# Patient Record
Sex: FEMALE | Race: BLACK OR AFRICAN AMERICAN | Employment: FULL TIME | URBAN - METROPOLITAN AREA
[De-identification: names, ages, dates, MRNs, and addresses within clinical notes are randomized per-mention and may not be internally consistent; named-entity substitution may affect disease eponyms.]

---

## 2017-12-05 ENCOUNTER — HOSPITAL ENCOUNTER (OUTPATIENT)
Dept: LAB | Age: 27
Discharge: HOME | End: 2017-12-05
Payer: OTHER GOVERNMENT

## 2017-12-05 PROCEDURE — 88175 CYTOPATH C/V AUTO FLUID REDO: CPT | Performed by: OBSTETRICS & GYNECOLOGY

## 2018-08-13 ENCOUNTER — OFFICE VISIT (OUTPATIENT)
Dept: FAMILY MEDICINE CLINIC | Age: 28
End: 2018-08-13

## 2018-08-13 ENCOUNTER — HOSPITAL ENCOUNTER (OUTPATIENT)
Dept: LAB | Age: 28
Discharge: HOME | End: 2018-08-13
Payer: OTHER GOVERNMENT

## 2018-08-13 VITALS
HEIGHT: 64 IN | DIASTOLIC BLOOD PRESSURE: 63 MMHG | BODY MASS INDEX: 22.61 KG/M2 | SYSTOLIC BLOOD PRESSURE: 114 MMHG | RESPIRATION RATE: 16 BRPM | TEMPERATURE: 98.8 F | OXYGEN SATURATION: 98 % | HEART RATE: 74 BPM | WEIGHT: 132.4 LBS

## 2018-08-13 DIAGNOSIS — L70.0 ACNE VULGARIS: ICD-10-CM

## 2018-08-13 DIAGNOSIS — Z00.00 ANNUAL PHYSICAL EXAM: Primary | ICD-10-CM

## 2018-08-13 DIAGNOSIS — D17.79 LIPOMA OF OTHER SPECIFIED SITES: ICD-10-CM

## 2018-08-13 DIAGNOSIS — Z00.00 ANNUAL PHYSICAL EXAM: ICD-10-CM

## 2018-08-13 LAB
ALBUMIN SERPL-MCNC: 3.5 G/DL (ref 3.4–5)
ALBUMIN/GLOB SERPL: 1 {RATIO} (ref 0.8–1.7)
ALP SERPL-CCNC: 19 U/L (ref 45–117)
ALT SERPL-CCNC: 16 U/L (ref 13–56)
ANION GAP SERPL CALC-SCNC: 7 MMOL/L (ref 3–18)
AST SERPL-CCNC: 11 U/L (ref 15–37)
BASOPHILS # BLD: 0 K/UL (ref 0–0.1)
BASOPHILS NFR BLD: 0 % (ref 0–2)
BILIRUB SERPL-MCNC: 0.6 MG/DL (ref 0.2–1)
BUN SERPL-MCNC: 12 MG/DL (ref 7–18)
BUN/CREAT SERPL: 18 (ref 12–20)
CALCIUM SERPL-MCNC: 8.6 MG/DL (ref 8.5–10.1)
CHLORIDE SERPL-SCNC: 108 MMOL/L (ref 100–108)
CHOLEST SERPL-MCNC: 126 MG/DL
CO2 SERPL-SCNC: 25 MMOL/L (ref 21–32)
CREAT SERPL-MCNC: 0.67 MG/DL (ref 0.6–1.3)
DIFFERENTIAL METHOD BLD: ABNORMAL
EOSINOPHIL # BLD: 0.2 K/UL (ref 0–0.4)
EOSINOPHIL NFR BLD: 3 % (ref 0–5)
ERYTHROCYTE [DISTWIDTH] IN BLOOD BY AUTOMATED COUNT: 13 % (ref 11.6–14.5)
GLOBULIN SER CALC-MCNC: 3.6 G/DL (ref 2–4)
GLUCOSE SERPL-MCNC: 114 MG/DL (ref 74–99)
HCT VFR BLD AUTO: 34.9 % (ref 35–45)
HDLC SERPL-MCNC: 66 MG/DL (ref 40–60)
HDLC SERPL: 1.9 {RATIO} (ref 0–5)
HGB BLD-MCNC: 11.2 G/DL (ref 12–16)
LDLC SERPL CALC-MCNC: 50 MG/DL (ref 0–100)
LIPID PROFILE,FLP: ABNORMAL
LYMPHOCYTES # BLD: 1.6 K/UL (ref 0.9–3.6)
LYMPHOCYTES NFR BLD: 27 % (ref 21–52)
MCH RBC QN AUTO: 27.6 PG (ref 24–34)
MCHC RBC AUTO-ENTMCNC: 32.1 G/DL (ref 31–37)
MCV RBC AUTO: 86 FL (ref 74–97)
MONOCYTES # BLD: 0.4 K/UL (ref 0.05–1.2)
MONOCYTES NFR BLD: 7 % (ref 3–10)
NEUTS SEG # BLD: 3.8 K/UL (ref 1.8–8)
NEUTS SEG NFR BLD: 63 % (ref 40–73)
PLATELET # BLD AUTO: 318 K/UL (ref 135–420)
PMV BLD AUTO: 10.5 FL (ref 9.2–11.8)
POTASSIUM SERPL-SCNC: 3.7 MMOL/L (ref 3.5–5.5)
PROT SERPL-MCNC: 7.1 G/DL (ref 6.4–8.2)
RBC # BLD AUTO: 4.06 M/UL (ref 4.2–5.3)
SODIUM SERPL-SCNC: 140 MMOL/L (ref 136–145)
TRIGL SERPL-MCNC: 50 MG/DL (ref ?–150)
TSH SERPL DL<=0.05 MIU/L-ACNC: 0.61 UIU/ML (ref 0.36–3.74)
VLDLC SERPL CALC-MCNC: 10 MG/DL
WBC # BLD AUTO: 6.1 K/UL (ref 4.6–13.2)

## 2018-08-13 PROCEDURE — 80061 LIPID PANEL: CPT | Performed by: INTERNAL MEDICINE

## 2018-08-13 PROCEDURE — 36415 COLL VENOUS BLD VENIPUNCTURE: CPT | Performed by: INTERNAL MEDICINE

## 2018-08-13 PROCEDURE — 85025 COMPLETE CBC W/AUTO DIFF WBC: CPT | Performed by: INTERNAL MEDICINE

## 2018-08-13 PROCEDURE — 80053 COMPREHEN METABOLIC PANEL: CPT | Performed by: INTERNAL MEDICINE

## 2018-08-13 PROCEDURE — 84443 ASSAY THYROID STIM HORMONE: CPT | Performed by: INTERNAL MEDICINE

## 2018-08-13 RX ORDER — NORGESTIMATE AND ETHINYL ESTRADIOL 0.25-0.035
1 KIT ORAL DAILY
Status: ON HOLD | COMMUNITY
End: 2019-06-17 | Stop reason: ALTCHOICE

## 2018-08-13 RX ORDER — BENZOYL PEROXIDE 5 G/100G
GEL TOPICAL
Qty: 60 G | Refills: 0 | Status: ON HOLD | OUTPATIENT
Start: 2018-08-13 | End: 2019-07-04

## 2018-08-13 NOTE — PROGRESS NOTES
Eleuterio Fallon is a 28year old female presenting with a lump on her back, on the right side. It was first noticed 4 years ago and she has not had any prior lumps. The lump seems to be enlarging. She does not take any medication for this lump. The lump is accompanied by occasional shoulder stiffness/soreness, mostly with exertion that is localized and does not radiate. The associated pain is mild. There seems to be no connection between time of day and size. Patient denies fever, chills, fatigue or weight loss     PMH:   - Anemia  - Sciatica   - Acne     Meds:   -  Ethinyl Estradiol/Norgestimate    PSH:   - Mccall Teeth, 1 year ago, without complications    Allergies:  - No known drug or food allergies, has seasonal allergies, dust and pet dander allergies as well (cause sneezing and runny nose)     FH:  - Mother, 48, severe asthma, anemic, gets \"cysts\"   - Father, , patient not aware of health   - Rominaer, 4, Healthy     SH:   - No tobacco use  - Drinks wine, 9-/  - No illicit drugs   - Eats an overall well balanced meal   - Gym 2-3/week   -  Sexually Active, PAPs annually, no concern of STI  - Needs Vaccine records reviewed for needed vaccines, likely needs tDAP. ROS:   -     Rep/Gyn:   LMP:   *ATTENTION:  This note has been created by a medical student for educational purposes only. Please do not refer to the content of this note for clinical decision-making, billing, or other purposes. Please see attending physicians note to obtain clinical information on this patient. *

## 2018-08-13 NOTE — MR AVS SNAPSHOT
[address] [facility] 
 
 
 [address] [facility] [address] [facility] [address] 
[phone] Patient: Boy Hogan MRN: [id number] GJN:[id number] Visit Information Date & Time Provider Department Dept. Phone Encounter #  
 8/13/2018  3:00 PM Ashly Trevino [phone] [id number] Upcoming Health Maintenance Date Due DTaP/Tdap/Td series (1 - Tdap) 8/21/2010 Influenza Age 5 to Adult 8/1/2018 PAP AKA CERVICAL CYTOLOGY 12/5/2020 Allergies as of 8/13/2018  Review Complete On: 8/13/2018 By: Abelino Parents, LPN Severity Noted Reaction Type Reactions Other Plant, Animal, Environmental  05/07/2015    Cough, Sneezing, Other (comments) Seasonal allergies - sneezing, cough, itchy eyes Current Immunizations  Reviewed on 2/15/2014 No immunizations on file. Not reviewed this visit You Were Diagnosed With   
  
 Codes Comments Annual physical exam    -  Primary ICD-10-CM: Z00.00 ICD-9-CM: V70.0 Acne vulgaris     ICD-10-CM: L70.0 ICD-9-CM: 706.1 Lipoma of other specified sites     ICD-10-CM: D17.79 ICD-9-CM: 214.8 Vitals BP Pulse Temp Resp Height(growth percentile) Weight(growth percentile) 114/63 74 98.8 °F (37.1 °C) (Oral) 16 5' 4\" (1.626 m) 132 lb 6.4 oz (60.1 kg) LMP SpO2 BMI OB Status Smoking Status 07/11/2018 98% 22.73 kg/m2 Postpartum Never Smoker Vitals History BMI and BSA Data Body Mass Index Body Surface Area  
 22.73 kg/m 2 1.65 m 2 Preferred Pharmacy Pharmacy Name Phone [facility] [place], [address] [place] [address] [phone] Your Updated Medication List  
  
   
This list is accurate as of 8/13/18  3:59 PM.  Always use your most recent med list.  
  
  
  
  
 benzoyl peroxide 5 % topical gel Apply  to affected area nightly. apply to affected area as directed ibuprofen 800 mg tablet Commonly known as:  MOTRIN You may take this three times a day for only 2-3 days. Iron 325 mg (65 mg iron) tablet Generic drug:  ferrous sulfate Take  by mouth Daily (before breakfast). prenatal multivit-ca-min-fe-fa Tab Take  by mouth. [address] [facility] (28) 0.25-35 mg-mcg Tab Generic drug:  norgestimate-ethinyl estradiol Take 1 Tab by mouth daily. Prescriptions Sent to Pharmacy Refills  
 benzoyl peroxide 5 % topical gel 0 Sig: Apply  to affected area nightly. apply to affected area as directed Class: Normal  
 Pharmacy: [facility] [address] [place] [address] Ph #: [phone] Route: Topical  
  
We Performed the Following REFERRAL TO GENERAL SURGERY [REF27 Custom] To-Do List   
 08/13/2018 Lab:  CBC WITH AUTOMATED DIFF   
  
 08/13/2018 Lab:  LIPID PANEL   
  
 08/13/2018 Lab:  METABOLIC PANEL, COMPREHENSIVE   
  
 08/13/2018 Lab:  TSH 3RD GENERATION Referral Information Referral ID Referred By Referred To  
  
 [id number] [address] Not Available Visits Status Start Date End Date 1 New Request 8/13/18 8/13/19 If your referral has a status of pending review or denied, additional information will be sent to support the outcome of this decision. Introducing \[facility]\"" & HEALTH SERVICES! Philomena Wagner introduces [employer] patient portal. Now you can access parts of your medical record, email your doctor's office, and request medication refills online. 1. In your internet browser, go to [email or web address] 2. Click on the First Time User? Click Here link in the Sign In box. You will see the New Member Sign Up page. 3. Enter your [employer] Access Code exactly as it appears below. You will not need to use this code after youve completed the sign-up process.  If you do not sign up before the expiration date, you must request a new code. · Dimdim Access Code: [email or web address] Expires: 11/11/2018  3:59 PM 
 
4. Enter the last four digits of your Social Security Number (xxxx) and Date of Birth (mm/dd/yyyy) as indicated and click Submit. You will be taken to the next sign-up page. 5. Create a Dimdim ID. This will be your Dimdim login ID and cannot be changed, so think of one that is secure and easy to remember. 6. Create a Dimdim password. You can change your password at any time. 7. Enter your Password Reset Question and Answer. This can be used at a later time if you forget your password. 8. Enter your e-mail address. You will receive e-mail notification when new information is available in [address] 9. Click Sign Up. You can now view and download portions of your medical record. 10. Click the Download Summary menu link to download a portable copy of your medical information. If you have questions, please visit the Frequently Asked Questions section of the Dimdim website. Remember, Dimdim is NOT to be used for urgent needs. For medical emergencies, dial 911. Now available from your iPhone and Android! Please provide this summary of care documentation to your next provider. Your primary care clinician is listed as Veronika Cheng. If you have any questions after today's visit, please call [phone].

## 2018-08-15 ENCOUNTER — TELEPHONE (OUTPATIENT)
Dept: FAMILY MEDICINE CLINIC | Age: 28
End: 2018-08-15

## 2018-08-15 DIAGNOSIS — D50.8 OTHER IRON DEFICIENCY ANEMIA: Primary | ICD-10-CM

## 2018-08-15 RX ORDER — LANOLIN ALCOHOL/MO/W.PET/CERES
325 CREAM (GRAM) TOPICAL 2 TIMES DAILY
Qty: 60 TAB | Refills: 3 | Status: SHIPPED | OUTPATIENT
Start: 2018-08-15 | End: 2021-06-14

## 2018-08-22 ENCOUNTER — OFFICE VISIT (OUTPATIENT)
Dept: SURGERY | Age: 28
End: 2018-08-22

## 2018-08-22 VITALS
SYSTOLIC BLOOD PRESSURE: 116 MMHG | WEIGHT: 131.8 LBS | HEIGHT: 64 IN | RESPIRATION RATE: 16 BRPM | BODY MASS INDEX: 22.5 KG/M2 | TEMPERATURE: 98.5 F | DIASTOLIC BLOOD PRESSURE: 68 MMHG | OXYGEN SATURATION: 99 % | HEART RATE: 69 BPM

## 2018-08-22 DIAGNOSIS — M25.811 MASS OF JOINT OF RIGHT SHOULDER: Primary | ICD-10-CM

## 2018-08-22 NOTE — MR AVS SNAPSHOT
[address] [facility] 
 
 
 [address] [place] [address] 
[phone] Patient: Natasha Escobar MRN: [id number] [facility]:[birth year] Visit Information Date & Time Provider Department Dept. Phone Encounter #  
 8/22/2018  8:15 AM Ravinder Aguiar MD [facility] [phone] [id number] Your Appointments 10/10/2018  3:45 PM  
POST OP with Ravinder Aguiar MD  
[address] [facility] Appt Note: Post op [address] [place] [address] [place]  
  
   
 [address] [facility] [address] [facility] [address] Upcoming Health Maintenance Date Due DTaP/Tdap/Td series (1 - Tdap) 8/21/2010 Influenza Age 5 to Adult 8/1/2018 PAP AKA CERVICAL CYTOLOGY 12/5/2020 Allergies as of 8/22/2018  Review Complete On: 8/22/2018 By: Conor Livingston LPN Severity Noted Reaction Type Reactions Other Plant, Animal, Environmental  05/07/2015    Cough, Sneezing, Other (comments) Seasonal allergies - sneezing, cough, itchy eyes Current Immunizations  Reviewed on 2/15/2014 No immunizations on file. Not reviewed this visit You Were Diagnosed With   
  
 Codes Comments Mass of joint of right shoulder    -  Primary ICD-10-CM: M25.811 ICD-9-CM: 719.61 Vitals BP Pulse Temp Resp Height(growth percentile) Weight(growth percentile) 116/68 (BP 1 Location: Right arm, BP Patient Position: Sitting) 69 98.5 °F (36.9 °C) (Oral) 16 5' 4\" (1.626 m) 131 lb 12.8 oz (59.8 kg) SpO2 BMI OB Status Smoking Status 99% 22.62 kg/m2 Postpartum Never Smoker BMI and BSA Data Body Mass Index Body Surface Area  
 22.62 kg/m 2 1.64 m 2 Preferred Pharmacy Pharmacy Name Phone [facility] [address] [phone] Your Updated Medication List  
  
   
This list is accurate as of 8/22/18  8:53 AM.  Always use your most recent med list.  
  
  
  
  
 benzoyl peroxide 5 % topical gel Apply  to affected area nightly. apply to affected area as directed  
  
 ibuprofen 800 mg tablet Commonly known as:  MOTRIN You may take this three times a day for only 2-3 days. * Iron 325 mg (65 mg iron) tablet Generic drug:  ferrous sulfate Take  by mouth Daily (before breakfast). * ferrous sulfate 325 mg (65 mg iron) tablet Take 1 Tab by mouth two (2) times a day. prenatal multivit-ca-min-fe-fa Tab Take  by mouth. [facility] (28) 0.25-35 mg-mcg Tab Generic drug:  norgestimate-ethinyl estradiol Take 1 Tab by mouth daily. * Notice: This list has 2 medication(s) that are the same as other medications prescribed for you. Read the directions carefully, and ask your doctor or other care provider to review them with you. Patient Instructions If you have any questions or concerns about today's appointment, the verbal and/or written instructions you were given for follow up care, please call our office at [phone]. [facility] [id number] [place] [address], [address] 
 
[phone] office [phone] fax PATIENT PRE AND POST OPERATIVE INSTRUCTIONS [address] [facility] 
[phone] Before Surgery Instructions:  
1) You must have someone available to drive you to and from your procedure and stay with you for the first 24 hours. 2) It is very important that you have nothing to eat or drink after midnight the night before your surgery. This includes chewing gum or sucking on hard candy. Take only heart, blood pressure and cholesterol medications the morning of surgery with only a sip of water. 3) Please stop taking Plavix 10 days prior to your surgery.  Stop taking Coumadin 5 days prior to your surgery. Stop taking all Aspirin or Aspirin containing products 7 days prior to your surgery. Stop taking Advil, Motrin, Aleve, and etc. 3 days prior to your surgery. 4) If you take any diabetic medications please consult with your primary care physician on how to take them on the day of your surgery 5) Please stop all Herbal products 2 weeks prior to your surgery. 6) Please arrive at the hospital 2 hours prior to your surgery, unless you have been otherwise instructed. 7) Patients having an operation on their colon will be given a separate instruction sheet on their Bowel Prep. 8) For any pre-operative work up check in at the main entrance to [facility], and then go to Patient Registration. These studies are done on a walk in basis they are open from 7:00am to 5:00pm Monday through Friday. 9) Please wash your surgical site the morning of your surgery with soap and water. 10) If you are of child bearing age you will have pregnancy test done the morning of your surgery as soon as you arrive. 11) You may be contacted to change your surgery time. At times this is necessary due to equipment or staffing needs. After Surgery Instructions: You will need to be seen in the office for a follow-up visit 7-14 days after your surgery. Please call after you have had the procedure to make this appointment. Unless otherwise instructed, you may remove your outer bandage and shower 48 hours after your surgery. If you develop a fever greater than 101, have any significant drainage, bleeding, swelling and/or pus of the wound. Please call our office immediately. Surgery Date and Time:  Thursday, September 27, 2018 at 1:30pm 
 
Please check in at [facility], enter through the Emergency Room entrance and go up to the second floor. Please check in by 11:30am the day of your surgery. You may contact Fili Almendarez with any questions at [phone]. Patient Instructions History Introducing \[facility]\"" & HEALTH SERVICES! Alphonso Luna introduces [employer] patient portal. Now you can access parts of your medical record, email your doctor's office, and request medication refills online. 1. In your internet browser, go to [email or web address] 2. Click on the First Time User? Click Here link in the Sign In box. You will see the New Member Sign Up page. 3. Enter your [employer] Access Code exactly as it appears below. You will not need to use this code after youve completed the sign-up process. If you do not sign up before the expiration date, you must request a new code. · [employer] Access Code: [email or web address] Expires: 11/11/2018  3:59 PM 
 
4. Enter the last four digits of your Social Security Number (xxxx) and Date of Birth (mm/dd/yyyy) as indicated and click Submit. You will be taken to the next sign-up page. 5. Create a DanceTrippin ID. This will be your [employer] login ID and cannot be changed, so think of one that is secure and easy to remember. 6. Create a [employer] password. You can change your password at any time. 7. Enter your Password Reset Question and Answer. This can be used at a later time if you forget your password. 8. Enter your e-mail address. You will receive e-mail notification when new information is available in [address] 9. Click Sign Up. You can now view and download portions of your medical record. 10. Click the Download Summary menu link to download a portable copy of your medical information. If you have questions, please visit the Frequently Asked Questions section of the [employer] website. Remember, [employer] is NOT to be used for urgent needs. For medical emergencies, dial 911. Now available from your iPhone and Android! Please provide this summary of care documentation to your next provider. Your primary care clinician is listed as Rhona Martins.  If you have any questions after today's visit, please call [phone].

## 2018-08-22 NOTE — PROGRESS NOTES
General Surgery Consult    Nona Garcia  Admit date: (Not on file)    MRN: [id number]     : 1990     Age: 33 y.o. Attending Physician: Catherine To MD, [facility]      History of Present Illness:      Nona Garcia is a 33 y.o. female who presented with a right shoulder mass . The patient has stated that this mass has been present for at least 4 years . She was scheduled to have the mass surgically removed however the patient did not get pregnant and this was canceled . The patient is stating that the mass has been increasing in size . She said the people are noticing that the mass has been increasing in size. She is also complaining of pain and discomfort especially with movement. She denies any fever or chills. She denies any drainage from the mass. She denies any skin changes though she has a tattoo on the mass. Patient Active Problem List    Diagnosis Date Noted    Vacuum extraction, delivered, current hospitalization 2014    Second degree laceration of perineum, delivered, current hospitalization 2014    Normal labor 2014    40 weeks gestation of pregnancy 2014     History reviewed. No pertinent past medical history. History reviewed. No pertinent surgical history. Social History   Substance Use Topics    Smoking status: Never Smoker    Smokeless tobacco: Never Used    Alcohol use No      History   Smoking Status    Never Smoker   Smokeless Tobacco    Never Used     Family History   Problem Relation Age of Onset    Asthma Mother     Cancer Father     Diabetes Maternal Aunt     Diabetes Maternal Grandmother     Hypertension Maternal Grandmother     Kidney Disease Maternal Grandmother      dialysis      Current Outpatient Prescriptions   Medication Sig    ferrous sulfate 325 mg (65 mg iron) tablet Take 1 Tab by mouth two (2) times a day.     norgestimate-ethinyl estradiol (SPRINTEC, 28,) 0.25-35 mg-mcg tab Take 1 Tab by mouth daily.    benzoyl peroxide 5 % topical gel Apply  to affected area nightly. apply to affected area as directed    prenatal multivit-ca-min-fe-fa tab Take  by mouth.  ferrous sulfate (IRON) 325 mg (65 mg iron) tablet Take  by mouth Daily (before breakfast).  ibuprofen (MOTRIN) 800 mg tablet You may take this three times a day for only 2-3 days. No current facility-administered medications for this visit. Allergies   Allergen Reactions    Other Plant, Animal, Environmental Cough, Sneezing and Other (comments)     Seasonal allergies - sneezing, cough, itchy eyes            Review of Systems:  Pertinent items are noted in the History of Present Illness. Objective:     Visit Vitals    /68 (BP 1 Location: Right arm, BP Patient Position: Sitting)    Pulse 69    Temp 98.5 °F (36.9 °C) (Oral)    Resp 16    Ht 5' 4\" (1.626 m)    Wt 59.8 kg (131 lb 12.8 oz)    SpO2 99%    BMI 22.62 kg/m2       Physical Exam:      General:  in no apparent distress, alert and oriented times 3   Eyes:  conjunctivae and sclerae normal, pupils equal, round, reactive to light   Throat & Neck: no erythema or exudates noted and neck supple and symmetrical; no palpable masses           Right shoulder/back: There is a large soft tissue tumor, about 8 x 5 cm located in the posterior right shoulder. The mass is easily movable and nontender. There is no overlying skin changes.                 Imaging and Lab Review:     CBC:   Lab Results   Component Value Date/Time    WBC 6.1 08/13/2018 04:06 PM    RBC 4.06 (L) 08/13/2018 04:06 PM    HGB 11.2 (L) 08/13/2018 04:06 PM    HCT 34.9 (L) 08/13/2018 04:06 PM    PLATELET 500 60/67/5659 04:06 PM     BMP:   Lab Results   Component Value Date/Time    Glucose 114 (H) 08/13/2018 04:06 PM    Sodium 140 08/13/2018 04:06 PM    Potassium 3.7 08/13/2018 04:06 PM    Chloride 108 08/13/2018 04:06 PM    CO2 25 08/13/2018 04:06 PM    BUN 12 08/13/2018 04:06 PM    Creatinine 0.67 08/13/2018 04:06 PM    Calcium 8.6 08/13/2018 04:06 PM     CMP:  Lab Results   Component Value Date/Time    Glucose 114 (H) 08/13/2018 04:06 PM    Sodium 140 08/13/2018 04:06 PM    Potassium 3.7 08/13/2018 04:06 PM    Chloride 108 08/13/2018 04:06 PM    CO2 25 08/13/2018 04:06 PM    BUN 12 08/13/2018 04:06 PM    Creatinine 0.67 08/13/2018 04:06 PM    Calcium 8.6 08/13/2018 04:06 PM    Anion gap 7 08/13/2018 04:06 PM    BUN/Creatinine ratio 18 08/13/2018 04:06 PM    Alk. phosphatase 19 (L) 08/13/2018 04:06 PM    Protein, total 7.1 08/13/2018 04:06 PM    Albumin 3.5 08/13/2018 04:06 PM    Globulin 3.6 08/13/2018 04:06 PM    A-G Ratio 1.0 08/13/2018 04:06 PM       No results found for this or any previous visit (from the past 24 hour(s)). images and reports reviewed    Assessment:   Natasha Escobar is a 33 y.o. female is presenting with a large right shoulder mass most likely a lipoma. The patient would like it to be excised giving the fact that is increasing in size and it is causing pain.      Plan:     Schedule for excision of a right shoulder mass    Please call me if you have any questions (cell phone: [phone])     Signed By: Ravinder Aguiar MD     August 22, 2018

## 2018-09-18 ENCOUNTER — TELEPHONE (OUTPATIENT)
Dept: SURGERY | Age: 28
End: 2018-09-18

## 2018-09-18 NOTE — TELEPHONE ENCOUNTER
Ms. Jimbo Brown called to cancel her surgery on Thursday, September 27, 2018 stating due to the weather last week, and leave of absence from work, she would like to reschedule her surgery for January 2019. I informed Ms. Anthony she would need to schedule a pre op visit in December 2018 for Dr. Marielos Kamara to re-evaluate the shoulder mass.   An appointment was scheduled for Monday, December 10, 2018 at 8:00am

## 2018-11-26 LAB
ANTIBODY SCREEN, EXTERNAL: NEGATIVE
CHLAMYDIA, EXTERNAL: NEGATIVE
HBSAG, EXTERNAL: NEGATIVE
HEPATITIS C AB,   EXT: NEGATIVE
HIV, EXTERNAL: NEGATIVE
N. GONORRHEA, EXTERNAL: NEGATIVE
RUBELLA, EXTERNAL: NORMAL
T. PALLIDUM, EXTERNAL: NEGATIVE
TYPE, ABO & RH, EXTERNAL: NORMAL

## 2019-01-23 ENCOUNTER — HOSPITAL ENCOUNTER (EMERGENCY)
Age: 29
Discharge: HOME | End: 2019-01-23
Attending: EMERGENCY MEDICINE
Payer: OTHER GOVERNMENT

## 2019-01-23 VITALS
DIASTOLIC BLOOD PRESSURE: 72 MMHG | SYSTOLIC BLOOD PRESSURE: 124 MMHG | RESPIRATION RATE: 16 BRPM | BODY MASS INDEX: 23.17 KG/M2 | TEMPERATURE: 98 F | HEART RATE: 87 BPM | WEIGHT: 135 LBS | OXYGEN SATURATION: 100 %
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
## 2019-01-23 DIAGNOSIS — S63.91XA HAND SPRAIN, RIGHT, INITIAL ENCOUNTER: ICD-10-CM

## 2019-01-23 DIAGNOSIS — S13.4XXA WHIPLASH INJURIES, INITIAL ENCOUNTER: Primary | ICD-10-CM

## 2019-01-23 PROCEDURE — 74011250637 HC RX REV CODE- 250/637: Performed by: PHYSICIAN ASSISTANT

## 2019-01-23 PROCEDURE — 99283 EMERGENCY DEPT VISIT LOW MDM: CPT

## 2019-01-23 RX ORDER — ACETAMINOPHEN 325 MG/1
650 TABLET ORAL ONCE
Status: COMPLETED | OUTPATIENT
Start: 2019-01-23 | End: 2019-01-23

## 2019-01-23 RX ADMIN — ACETAMINOPHEN 650 MG: 325 TABLET, FILM COATED ORAL at 15:24
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
## 2019-01-23 NOTE — LETTER
[facility] EMERGENCY DEPT 
[address] [place] [id number] 
[phone] Work/School Note Date: 1/23/2019 To Whom It May concern: 
 
Cady Davis was seen and treated today in the emergency room by the following provider(s): 
Attending Provider: Yanna Osuna DO Physician Assistant: Megan Gómez. Rhina Cueto Newberry may return to work on 1/26/2019.  
 
Sincerely, 
 
 
 
 
VERNA Santana

## 2019-01-23 NOTE — ED NOTES
I have reviewed discharge instructions with the patient. The patient verbalized understanding. Pain addressed with med given in ED prior to discharge.

## 2019-01-31 ENCOUNTER — OFFICE VISIT (OUTPATIENT)
Dept: FAMILY MEDICINE CLINIC | Age: 29
End: 2019-01-31

## 2019-01-31 VITALS
DIASTOLIC BLOOD PRESSURE: 59 MMHG | SYSTOLIC BLOOD PRESSURE: 98 MMHG | RESPIRATION RATE: 16 BRPM | TEMPERATURE: 97.9 F | WEIGHT: 136 LBS | BODY MASS INDEX: 23.22 KG/M2 | HEIGHT: 64 IN | OXYGEN SATURATION: 99 % | HEART RATE: 91 BPM

## 2019-01-31 DIAGNOSIS — M25.511 ACUTE PAIN OF RIGHT SHOULDER: Primary | ICD-10-CM

## 2019-01-31 DIAGNOSIS — G44.89 OTHER HEADACHE SYNDROME: ICD-10-CM
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
## 2019-02-12 ENCOUNTER — APPOINTMENT (OUTPATIENT)
Dept: MRI IMAGING | Age: 29
End: 2019-02-12
Attending: INTERNAL MEDICINE

## 2019-03-04 ENCOUNTER — HOSPITAL ENCOUNTER (OUTPATIENT)
Dept: MRI IMAGING | Age: 29
Discharge: HOME | End: 2019-03-04
Attending: INTERNAL MEDICINE

## 2019-03-04 DIAGNOSIS — G44.89 OTHER HEADACHE SYNDROME: ICD-10-CM

## 2019-03-04 DIAGNOSIS — M25.511 ACUTE PAIN OF RIGHT SHOULDER: ICD-10-CM
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
## 2019-06-10 ENCOUNTER — HOSPITAL ENCOUNTER (OUTPATIENT)
Dept: INFUSION THERAPY | Age: 29
Discharge: HOME | End: 2019-06-10
Payer: OTHER GOVERNMENT

## 2019-06-10 VITALS
DIASTOLIC BLOOD PRESSURE: 66 MMHG | TEMPERATURE: 98 F | SYSTOLIC BLOOD PRESSURE: 110 MMHG | HEART RATE: 88 BPM | RESPIRATION RATE: 18 BRPM

## 2019-06-10 PROCEDURE — 96365 THER/PROPH/DIAG IV INF INIT: CPT

## 2019-06-10 PROCEDURE — 74011000258 HC RX REV CODE- 258: Performed by: OBSTETRICS & GYNECOLOGY

## 2019-06-10 PROCEDURE — 74011250636 HC RX REV CODE- 250/636: Performed by: OBSTETRICS & GYNECOLOGY

## 2019-06-10 RX ADMIN — IRON SUCROSE 100 MG: 20 INJECTION, SOLUTION INTRAVENOUS at 15:26

## 2019-06-10 NOTE — PROGRESS NOTES
[facility] Progress Note    Date: Ana 10, 2019    Name: Yaima Lopez    MRN: [id number]         : 1990    Venofer    Ms. Anthony was assessed and education was provided. Ms. Anthony's vitals were reviewed and patient was observed for 5 minutes prior to treatment. Visit Vitals  /66 (BP 1 Location: Left arm, BP Patient Position: Sitting)   Pulse 88   Temp 98 °F (36.7 °C)   Resp 18       Lab results were obtained and reviewed. No results found for this or any previous visit (from the past 12 hour(s)). Saline lock to right AC using 22g catheter line flushes briskly. Venofer was infused over 15 mins. Ms. Gera Davis tolerated the infusion, and had no complaints. Patient armband removed and shredded. Ms. Gera Davis was discharged from [place] [address] in stable condition at [id number]. She is to return on 19 at 1100 for her next appointment.     Nae Maldonado RN  Ana 10, 2019  5:12 PM

## 2019-06-11 ENCOUNTER — HOSPITAL ENCOUNTER (OUTPATIENT)
Dept: INFUSION THERAPY | Age: 29
Discharge: HOME | End: 2019-06-11
Payer: OTHER GOVERNMENT

## 2019-06-11 VITALS
HEART RATE: 86 BPM | OXYGEN SATURATION: 100 % | DIASTOLIC BLOOD PRESSURE: 61 MMHG | RESPIRATION RATE: 18 BRPM | SYSTOLIC BLOOD PRESSURE: 110 MMHG | TEMPERATURE: 98.2 F

## 2019-06-11 LAB — GRBS, EXTERNAL: NEGATIVE

## 2019-06-11 PROCEDURE — 96365 THER/PROPH/DIAG IV INF INIT: CPT

## 2019-06-11 PROCEDURE — 74011250636 HC RX REV CODE- 250/636: Performed by: OBSTETRICS & GYNECOLOGY

## 2019-06-11 PROCEDURE — 74011000258 HC RX REV CODE- 258: Performed by: OBSTETRICS & GYNECOLOGY

## 2019-06-11 RX ADMIN — IRON SUCROSE 100 MG: 20 INJECTION, SOLUTION INTRAVENOUS at 11:22

## 2019-06-11 NOTE — PROGRESS NOTES
[facility] Progress Note    Date: 2019    Name: Fatimah Lemus    MRN: [id number]         : 1990    Venofer 2 of 3    Ms. Anthony was assessed and education was provided. Ms. Anthony's vitals were reviewed and patient was observed for 5 minutes prior to treatment. Visit Vitals  /61 (BP 1 Location: Left arm, BP Patient Position: Sitting)   Pulse 86   Temp 98.2 °F (36.8 °C)   Resp 18   SpO2 100%       Lab results were obtained and reviewed. Saline lock to right AC using 22g catheter line flushes briskly. Venofer was infused over 15 mins. Ms. Evie Banks tolerated the infusion, and had no complaints. Patient armband removed and shredded. Ms. Evie Banks was discharged from [place] [address] in stable condition at 1145. She is to return on 19 at 1500 for her next appointment.     Rama Robison RN  2019

## 2019-06-14 ENCOUNTER — HOSPITAL ENCOUNTER (OUTPATIENT)
Dept: INFUSION THERAPY | Age: 29
Discharge: HOME | End: 2019-06-14
Payer: OTHER GOVERNMENT

## 2019-06-14 VITALS
RESPIRATION RATE: 18 BRPM | SYSTOLIC BLOOD PRESSURE: 108 MMHG | DIASTOLIC BLOOD PRESSURE: 65 MMHG | OXYGEN SATURATION: 100 % | TEMPERATURE: 97.8 F | HEART RATE: 105 BPM

## 2019-06-14 PROCEDURE — 96365 THER/PROPH/DIAG IV INF INIT: CPT

## 2019-06-17 ENCOUNTER — HOSPITAL ENCOUNTER (OUTPATIENT)
Age: 29
Setting detail: OBSERVATION
Discharge: HOME | End: 2019-06-17
Attending: OBSTETRICS & GYNECOLOGY | Admitting: OBSTETRICS & GYNECOLOGY
Payer: OTHER GOVERNMENT

## 2019-06-17 VITALS
RESPIRATION RATE: 16 BRPM | WEIGHT: 150 LBS | HEART RATE: 86 BPM | BODY MASS INDEX: 27.6 KG/M2 | TEMPERATURE: 98.5 F | DIASTOLIC BLOOD PRESSURE: 66 MMHG | HEIGHT: 62 IN | SYSTOLIC BLOOD PRESSURE: 128 MMHG

## 2019-06-17 PROBLEM — O47.9 UTERINE CONTRACTIONS DURING PREGNANCY: Status: ACTIVE | Noted: 2019-06-17

## 2019-06-17 PROBLEM — Z3A.37 37 WEEKS GESTATION OF PREGNANCY: Status: ACTIVE | Noted: 2019-06-17

## 2019-06-17 PROCEDURE — 99283 EMERGENCY DEPT VISIT LOW MDM: CPT

## 2019-06-17 PROCEDURE — 59025 FETAL NON-STRESS TEST: CPT

## 2019-06-17 PROCEDURE — 99218 HC RM OBSERVATION: CPT

## 2019-06-17 NOTE — DISCHARGE SUMMARY
Antepartum Discharge Summary     Name: Jose Guadalupe Verdugo MRN: [id number]  SSN: [id number]    YOB: 1990  Age: 33 y.o. Sex: female      Admit Date: 6/17/2019    Discharge Date: 6/17/2019     Admitting Physician: Nelly Garnica MD     Attending Physician:  Junior Vinita MD     Admission Diagnoses: Uterine contractions during pregnancy [O62.2]    Discharge Diagnoses:   Problem List as of 6/17/2019 Date Reviewed: 6/17/2019          Codes Class Noted - Resolved    37 weeks gestation of pregnancy ICD-10-CM: Z3A.37  ICD-9-CM: V22.2  6/17/2019 - Present        * (Principal) Uterine contractions during pregnancy ICD-10-CM: O62.2  ICD-9-CM: 661.20  6/17/2019 - Present        RESOLVED: Vacuum extraction, delivered, current hospitalization ICD-10-CM: O66.5  ICD-9-CM: 669.51  2/14/2014 - 6/17/2019        RESOLVED: Second degree laceration of perineum, delivered, current hospitalization ICD-10-CM: O70.1  ICD-9-CM: 664.11  2/14/2014 - 6/17/2019        RESOLVED: Normal labor ICD-10-CM: O80, Z37.9  ICD-9-CM: 488  2/13/2014 - 6/17/2019        RESOLVED: 40 weeks gestation of pregnancy ICD-10-CM: Z3A.40  ICD-9-CM: V22.2  2/13/2014 - 6/17/2019        RESOLVED: Threatened labor at term ICD-10-CM: O47.9  ICD-9-CM: 644.13  2/12/2014 - 2/13/2014             Lab Results   Component Value Date/Time    Rubella, External IMM 07/02/2013    GrBStrep, External NEG 01/10/2014       Immunization(s): There is no immunization history for the selected administration types on file for this patient. Hospital Course:    - No LOF, VB or cervical change  - Not in labor. Patient Instructions:   Current Discharge Medication List      CONTINUE these medications which have NOT CHANGED    Details   prenatal multivit-ca-min-fe-fa tab Take  by mouth. ferrous sulfate 325 mg (65 mg iron) tablet Take 1 Tab by mouth two (2) times a day.   Qty: 60 Tab, Refills: 3    Associated Diagnoses: Other iron deficiency anemia      benzoyl peroxide 5 % topical gel Apply  to affected area nightly. apply to affected area as directed  Qty: 60 g, Refills: 0    Associated Diagnoses: Acne vulgaris         STOP taking these medications       norgestimate-ethinyl estradiol ([facility], ,) 0.25-35 mg-mcg tab Comments:   Reason for Stopping:               Discussed comfort measures for pelvic pressure, including maternity support band, swimming pool, epsom salt bath, and position changes. Reviewed labor precautions. RTO for scheduled appointment in 2 days.     Signed By:  Billie Knott CNM     June 17, 2019

## 2019-06-17 NOTE — PROGRESS NOTES
1139- Patient in wheelchair from [address] [place] [address],[address] office with complaint of lower abdominal pressure starting last night. States she is unsure if they are contractions, but thinks they might be. Denies any vaginal bleeding or leaking of fluid. Call light in reach. No other needs. 1230- MIKE Freitas at bedside to discuss plan of care. 1245- patient was up to bathroom. No on birthing ball. Given ice water. 1310- No needs. Call light in reach. Spouse at bedside for support. 1317- MIKE Ragsdale at bedside. SVE.   1339- Discharge instructions reviewed with patient. Understanding verbalized of all instructions given. Time given for questions, all questions answered. Electronic signature obtained. Ambulatory off unit with spouse.

## 2019-06-17 NOTE — H&P
History & Physical    Name: Navi Yoo MRN: [id number]  SSN: [id number]    YOB: 1990  Age: 33 y.o. Sex: female        Subjective:       Estimated Date of Delivery: 19  OB History        2    Para   1    Term   1            AB        Living   1       SAB        TAB        Ectopic        Molar        Multiple        Live Births   1                OB HISTORY  Tayla Anthony was sent to L&D from the office for r/o labor. [facility] c/o occasional \"back contractions\" and increased pelvic pressure for 1 week, abdominal tightening since yesterday. Ms. Sargent is admitted with pregnancy at 37w4d for Increasingly painful contractions. Prenatal course was complicated by hemorrhoids in pregnancy, anemia Hgb 9.4 at 34wk, Venofer infusion x3. . Prenatal care has been followed by [facility]  starting at 8+4wga. Total wt gain 20lb. Admitted to 3415/01. History reviewed. No pertinent past medical history. History reviewed. No pertinent surgical history. Social History     Occupational History    Not on file   Tobacco Use    Smoking status: Never Smoker    Smokeless tobacco: Never Used   Substance and Sexual Activity    Alcohol use: No     Alcohol/week: 0.0 oz    Drug use: No    Sexual activity: Yes     Partners: Male     Family History   Problem Relation Age of Onset    Asthma Mother     Cancer Father     Diabetes Maternal Aunt     Diabetes Maternal Grandmother     Hypertension Maternal Grandmother     Kidney Disease Maternal Grandmother         dialysis       Allergies   Allergen Reactions    Other Plant, Animal, Environmental Cough, Sneezing and Other (comments)     Seasonal allergies - sneezing, cough, itchy eyes       Prior to Admission medications    Medication Sig Start Date End Date Taking? Authorizing Provider   prenatal multivit-ca-min-fe-fa tab Take  by mouth.    Yes Other, MD Jaylin   ferrous sulfate 325 mg (65 mg iron) tablet Take 1 Tab by mouth two (2) times a day. 8/15/18   Woodford Essex, MD   benzoyl peroxide 5 % topical gel Apply  to affected area nightly. apply to affected area as directed 8/13/18   Woodford Essex, MD   ferrous sulfate (IRON) 325 mg (65 mg iron) tablet Take  by mouth Daily (before breakfast). Other, MD Jaylin        Review of Systems: Pertinent items are noted in HPI. Objective:     Vitals:  Vitals:    06/17/19 1142 06/17/19 1145 06/17/19 1159   BP: 112/64  112/64   Pulse: 91  87   Resp: 16     Temp: 98.5 °F (36.9 °C)     Weight:  68 kg (150 lb)    Height:  5' 2\" (1.575 m)         Physical Exam:  Patient in no acute distress  Abdomen: Gravid, nontender  Cervix:1.5/50/-3, per exam in office  FHR:Baseline: 140 per minute  Variability: moderate  Accelerations: yes  Decelerations: none  Contractions: Every 3 minutes   EFW 7.5 # by Leopold's    Prenatal Labs:   Lab Results   Component Value Date/Time    Rubella, External IMM 07/02/2013    GrBStrep, External NEG 01/10/2014    HBsAg, External NEG 07/02/2013    HIV, External NEG 07/02/2013    RPR, External NR 07/02/2013    Gonorrhea, External NEG 07/02/2013    Chlamydia, External NEG 07/02/2013       Group B Strep was negative. Assessment/Plan:   Assessment: IUP @ 37w4d; FHT Category ; Vertex presentation. Patient Active Problem List   Diagnosis Code    37 weeks gestation of pregnancy Z3A.37    Uterine contractions during pregnancy O62.2       Plan: Admit for observation, evaluating for labor. .   PO hydration. Will recheck cvx after an hour of monitoring. Encouraged maternal position changes. Dr. Doc Azul notified.     Signed By:  Roland Soto CNM     June 17, 2019 12:43 PM

## 2019-06-17 NOTE — DISCHARGE INSTRUCTIONS
Patient Education        Week 37 of Your Pregnancy: Care Instructions  Your Care Instructions    You are near the end of your pregnancy--and you're probably pretty uncomfortable. It may be harder to walk around. Lying down probably isn't comfortable either. You may have trouble getting to sleep or staying asleep. Most women deliver their babies between 40 and 41 weeks. This is a good time to think about packing a bag for the hospital with items you'll need. Then you'll be ready when labor starts. Follow-up care is a key part of your treatment and safety. Be sure to make and go to all appointments, and call your doctor if you are having problems. It's also a good idea to know your test results and keep a list of the medicines you take. How can you care for yourself at home? Learn about breastfeeding  · Breastfeeding is best for your baby and good for you. · Breast milk has antibodies to help your baby fight infections. · Mothers who breastfeed often lose weight faster, because making milk burns calories. · Learning the best ways to hold your baby will make breastfeeding easier. · Let your partner bathe and diaper the baby to keep your partner from feeling left out. Snuggle together when you breastfeed. · You may want to learn how to use a breast pump and store your milk. · If you choose to bottle feed, make the feeding feel like breastfeeding so you can bond with your baby. Always hold your baby and the bottle. Do not prop bottles or let your baby fall asleep with a bottle. Learn about crying  · It is common for babies to cry for 1 to 3 hours a day. Some cry more, some cry less. · Babies don't cry to make you upset or because you are a bad parent. · Crying is how your baby communicates. Your baby may be hungry; have gas; need a diaper change; or feel cold, warm, tired, lonely, or tense. Sometimes babies cry for unknown reasons.   · If you respond to your baby's needs, he or she will learn to trust you.  · Try to stay calm when your baby cries. Your baby may get more upset if he or she senses that you are upset. Know how to care for your   · Your baby's umbilical cord stump will drop off on its own, usually between 1 and 2 weeks. To care for your baby's umbilical cord area:  ? Clean the area at the bottom of the cord 2 or 3 times a day. ? Pay special attention to the area where the cord attaches to the skin. ? Keep the diaper folded below the cord. ? Use a damp washcloth or cotton ball to sponge bathe your baby until the stump has come off. · Your baby's first dark stool is called meconium. After the meconium is passed, your baby will develop his or her own bowel pattern. ? Some babies, especially  babies, have several bowel movements a day. Others have one or two a day, or one every 2 to 3 days. ?  babies often have loose, yellow stools. Formula-fed babies have more formed stools. ? If your baby's stools look like little pellets, he or she is constipated. After 2 days of constipation, call your baby's doctor. · If your baby will be circumcised, you can care for him at home. ? Gently rinse his penis with warm water after every diaper change. Do not try to remove the film that forms on the penis. This film will go away on its own. Pat dry. ? Put petroleum ointment, such as Vaseline, on the area of the diaper that will touch your baby's penis. This will keep the diaper from sticking to your baby. ? Ask the doctor about giving your baby acetaminophen (Tylenol) for pain. Where can you learn more? Go to http://citlaly-joy.info/. Enter 68 21 97 in the search box to learn more about \"Week 37 of Your Pregnancy: Care Instructions. \"  Current as of: 2018  Content Version: 11.9  © 2460-2933 [employer]. Care instructions adapted under license by [employer] (which disclaims liability or warranty for this information).  If you have questions about a medical condition or this instruction, always ask your healthcare professional. [address] any warranty or liability for your use of this information. Patient Education        Early Stage of Labor at Home: Care Instructions  Your Care Instructions    If you came to the hospital while in early labor, your doctor may have asked if you want to labor at home until your contractions are stronger. Many women stay at home during early labor. This is often the longest part of the birthing process. It may last up to 2 to 3 days. Contractions are mild to moderate and shorter (about 30 to 45 seconds). You can usually keep talking during them. Contractions may also be irregular, about 5 to 20 minutes apart. They may even stop for a while. It helps to stay as relaxed as you can during this time. You can spend some or all of your early labor at home or anywhere else you may be comfortable. If you live far from the hospital or birthing center, you may want to think about going somewhere nearby so you can get back to the hospital quickly. For some women, there may be benefits to staying home during early labor, such as avoiding medicines or procedures. As labor progresses, you'll shift from early labor to active labor. During this time, contractions get more intense. They occur more often, about every 2 to 3 minutes. They also last longer, about 50 to 70 seconds. You will feel them even when you change positions and walk or move around. It may be hard to tell if you are in active labor. If you aren't sure, call your doctor or midwife. As your labor progresses, check in with your doctor or midwife about when to come back to the hospital or birthing center. You may have special instructions if your water broke or you tested positive for group B strep. Follow-up care is a key part of your treatment and safety.  Be sure to make and go to all appointments, and call your doctor if you are having problems. It's also a good idea to know your test results and keep a list of the medicines you take. How can you care for yourself at home? · Get support. Having a support person with you from early labor until after childbirth can have a positive effect on childbirth. · Find distractions. During early labor, you can walk, play cards, watch TV, or listen to music to help take your mind off your contractions. · Ask your partner, labor , or  for a massage. Shoulder and low back massage during contractions may ease your pain. Strong massage of the back muscles (counterpressure) during contractions may help relieve the pain of back labor. Tell your labor  exactly where to push and how hard to push. · Use imagery. This means using your imagination to decrease your pain. For instance, to help manage pain, picture your contractions as waves rolling over you. Picture a peaceful place, such as a beach or mountain stream, to help you relax between contractions. · Change positions during labor. Walking, kneeling, or sitting on a big rubber ball (birth ball) are good options. · Use focused breathing techniques. Breathing in a rhythm can distract you from pain. · Take a warm shower or bath. Warm water may ease pain and stress. When should you call for help? Call 911 anytime you think you may need emergency care. For example, call if:    · You passed out (lost consciousness).     · You have severe vaginal bleeding.     · You have severe pain in your belly or pelvis.     · You have had fluid gushing or leaking from your vagina and you know or think the umbilical cord is bulging into your vagina. If this happens, immediately get down on your knees so your rear end (buttocks) is higher than your head.  This will decrease the pressure on the cord until help arrives.   [facility] your doctor now or seek immediate medical care if:    · You have new or worse signs of preeclampsia, such as:  ? Sudden swelling of your face, hands, or feet. ? New vision problems (such as dimness or blurring). ? A severe headache.     · You have any vaginal bleeding.     · You have belly pain or cramping.     · You have a fever.     · You have had regular contractions (with or without pain) for an hour. This means that you have 8 or more within 1 hour or 4 or more in 20 minutes after you change your position and drink fluids.     · You have a sudden release of fluid from your vagina.     · You have low back pain or pelvic pressure that does not go away.     · You notice that your baby has stopped moving or is moving much less than normal.    Watch closely for changes in your health, and be sure to contact your doctor if you have any problems. Where can you learn more? Go to http://citlaly-joy.info/. Enter L270 in the search box to learn more about \"Early Stage of Labor at Home: Care Instructions. \"  Current as of: September 5, 2018  Content Version: 11.9  © 6395-2185 [employer]. Care instructions adapted under license by [employer] (which disclaims liability or warranty for this information). If you have questions about a medical condition or this instruction, always ask your healthcare professional. [address] any warranty or liability for your use of this information.

## 2019-06-24 ENCOUNTER — HOSPITAL ENCOUNTER (INPATIENT)
Age: 29
LOS: 1 days | Discharge: HOME | DRG: 833 | End: 2019-06-24
Attending: OBSTETRICS & GYNECOLOGY | Admitting: OBSTETRICS & GYNECOLOGY
Payer: OTHER GOVERNMENT

## 2019-06-24 VITALS
HEART RATE: 94 BPM | RESPIRATION RATE: 16 BRPM | BODY MASS INDEX: 27.6 KG/M2 | TEMPERATURE: 98.6 F | DIASTOLIC BLOOD PRESSURE: 69 MMHG | WEIGHT: 150 LBS | HEIGHT: 62 IN | SYSTOLIC BLOOD PRESSURE: 108 MMHG

## 2019-06-24 PROBLEM — N89.8 VAGINAL DISCHARGE IN THIRD TRIMESTER, ANTEPARTUM: Status: ACTIVE | Noted: 2019-06-24

## 2019-06-24 PROBLEM — O26.893 VAGINAL DISCHARGE IN THIRD TRIMESTER, ANTEPARTUM: Status: ACTIVE | Noted: 2019-06-24

## 2019-06-24 LAB
A1 MICROGLOB PLACENTAL VAG QL: NEGATIVE
CONTROL LINE PRESENT?: NORMAL
EXPIRATION DATE: NORMAL
INTERNAL NEGATIVE CONTROL: NORMAL
KIT LOT NO.: NORMAL

## 2019-06-24 PROCEDURE — 65270000029 HC RM PRIVATE

## 2019-06-24 PROCEDURE — 59025 FETAL NON-STRESS TEST: CPT

## 2019-06-24 PROCEDURE — 99282 EMERGENCY DEPT VISIT SF MDM: CPT

## 2019-06-24 PROCEDURE — 84112 EVAL AMNIOTIC FLUID PROTEIN: CPT | Performed by: ADVANCED PRACTICE MIDWIFE

## 2019-06-24 NOTE — PROGRESS NOTES
Denies headache, dizziness ,blurred vision. Denies vaginal bleeding denies vaginal leakage since arrival to hospital, admits to fetal movement now.

## 2019-06-24 NOTE — PROGRESS NOTES
Reviewed discharge instructions, verbalzies understanding of self care, denies questions, problems or c/o. Discharged to home.

## 2019-06-24 NOTE — PROGRESS NOTES
2121 patient arrived to unit from home with c/o decreased fetal movement. States last time normal movement felt was last night before bed. States usually very active at night. Placed on EFM, visual movement seen and felt by patient. Patient states unsure about possible SROM. States went to bathroom around 1930 and noticed clear fluid instead of yellow. No further leaking noticed until she sneezed hours later and had a little fluid come out. Urine sample collected on arrival, normal yellow coloring. Patient perplexed about changes in color. Informed her that it's normal for urine to lighten based on how much water she drinks. [facility] CN notified of patient's arrival and unsure SROM, suggested a possible amnisure to be sure. Gives permission for amni sure to be done. States she will be down to see patient in a few minutes.

## 2019-06-24 NOTE — ROUTINE PROCESS
Bedside and Verbal shift change report given to TQuail rn (oncoming nurse) by Naz Leone (offgoing nurse). Report included the following information SBAR and Kardex.

## 2019-06-24 NOTE — DISCHARGE INSTRUCTIONS
Discharge instructions reviewed with pt verbally and pt given written copy of instructions. NOTIFY YOUR DOCTOR/PROVIDER IF:    Signs and symptoms of labor-continuing tightening and relaxation of abdomen  Fever 100.4  Bleeding or leaking of fluid from the vagina  Persistent headache that does not go away with rest and tylenol  Severe abdominal pain    Fetal kick counts - 10 baby movements in 1 hour   Hydration- eight 8 oz glasses of water every day  Visual disturbances  Nausea and vomiting for more than 12 hours. Questions asked and answered.         Follow up as scheduled in office on wednesday

## 2019-06-24 NOTE — H&P
Obstetrical Problem History & Physical     Name: Gavino Costa MRN: [id number]  SSN: [id number]    YOB: 1990  Age: 33 y.o. Sex: female        Subjective:   Chief complaint:    Chief Complaint   Patient presents with    Decreased Fetal Movement       Rhina is here with report of gush of fluid on toilet and with sneeze this morning. Baby not moving prior to arrival.  Reassuring movement felt by Rhina and observed by RN at the bedside. Reactive tracing. Amnisure is negative. Geovanna Don reports being dry between the above events and since arrival.     OB HISTORY  OB History        2    Para   1    Term   1            AB        Living   1       SAB        TAB        Ectopic        Molar        Multiple        Live Births   1                PAST MEDICAL HISTORY  No past medical history on file. PAST SURGICAL HISTORY  No past surgical history on file.     SOCIAL HISTORY  Social History     Socioeconomic History    Marital status:      Spouse name: Not on file    Number of children: Not on file    Years of education: Not on file    Highest education level: Not on file   Occupational History    Not on file   Social Needs    Financial resource strain: Not on file    Food insecurity:     Worry: Not on file     Inability: Not on file    Transportation needs:     Medical: Not on file     Non-medical: Not on file   Tobacco Use    Smoking status: Never Smoker    Smokeless tobacco: Never Used   Substance and Sexual Activity    Alcohol use: No     Alcohol/week: 0.0 oz    Drug use: No    Sexual activity: Yes     Partners: Male   Lifestyle    Physical activity:     Days per week: Not on file     Minutes per session: Not on file    Stress: Not on file   Relationships    Social connections:     Talks on phone: Not on file     Gets together: Not on file     Attends [employer] service: Not on file     Active member of club or organization: Not on file     Attends meetings of clubs or organizations: Not on file     Relationship status: Not on file    Intimate partner violence:     Fear of current or ex partner: Not on file     Emotionally abused: Not on file     Physically abused: Not on file     Forced sexual activity: Not on file   Other Topics Concern    Not on file   Social History Narrative    Not on file       FAMILY HISTORY  Family History   Problem Relation Age of Onset    Asthma Mother     Cancer Father     Diabetes Maternal Aunt     Diabetes Maternal Grandmother     Hypertension Maternal Grandmother     Kidney Disease Maternal Grandmother         dialysis        ALLERGY:  Allergies   Allergen Reactions    Other Plant, Animal, Environmental Cough, Sneezing and Other (comments)     Seasonal allergies - sneezing, cough, itchy eyes         HOME MEDICATIONS:  Prior to Admission medications    Medication Sig Start Date End Date Taking? Authorizing Provider   ferrous sulfate 325 mg (65 mg iron) tablet Take 1 Tab by mouth two (2) times a day. 8/15/18  Yes Jory Chadwick MD   prenatal multivit-ca-min-fe-fa tab Take  by mouth. Yes Other, MD Jaylin   benzoyl peroxide 5 % topical gel Apply  to affected area nightly. apply to affected area as directed 8/13/18   Jory Chadwick MD          Objective:   VITAL SIGNS:  Visit Vitals  /69 (BP 1 Location: Left arm, BP Patient Position: At rest)   Pulse 94   Temp 98.6 °F (37 °C)   Resp 16   Ht 5' 2\" (1.575 m)   Wt 68 kg (150 lb)   BMI 27.44 kg/m²       Physical Exam:  Patient in no acute distress  Well perfused and respirations without effort  Abdomen: Gravid, nontender  Cervix: 2/70/-2 (some guarding)  FHTs Category 1 with baseline of 135. Not yet reactive but Rhina is hydrating. Occasional mild contraction  Extremities: nontender    Assessment:     IUP at term with intact membranes  Category 1 tracing    Plan:   Discussed SROM, labor, late pregnancy. Has appt in the office on Wednesday.   Keep appt, Return to L&D prn. Answered questions re sweeping the membranes. We can revisit this on Wednesday as exam table a better surface for her to have that exam.   Dr Raquel Horan aware of her status. Discharge home when strip reactive.   Signed By:    Jackie Burnett CNM  June 24, 2019 7:57 AM

## 2019-07-04 ENCOUNTER — ANESTHESIA (OUTPATIENT)
Dept: LABOR AND DELIVERY | Age: 29
End: 2019-07-04
Payer: OTHER GOVERNMENT

## 2019-07-04 ENCOUNTER — ANESTHESIA EVENT (OUTPATIENT)
Dept: LABOR AND DELIVERY | Age: 29
End: 2019-07-04
Payer: OTHER GOVERNMENT

## 2019-07-04 ENCOUNTER — HOSPITAL ENCOUNTER (INPATIENT)
Age: 29
LOS: 2 days | Discharge: HOME | End: 2019-07-06
Attending: OBSTETRICS & GYNECOLOGY | Admitting: OBSTETRICS & GYNECOLOGY
Payer: OTHER GOVERNMENT

## 2019-07-04 PROBLEM — Z34.90 PREGNANCY: Status: ACTIVE | Noted: 2019-07-04

## 2019-07-04 PROBLEM — O99.013 ANEMIA AFFECTING PREGNANCY IN THIRD TRIMESTER: Status: ACTIVE | Noted: 2019-07-04

## 2019-07-04 PROBLEM — O22.40 HEMORRHOIDS IN PREGNANCY: Status: ACTIVE | Noted: 2019-07-04

## 2019-07-04 PROBLEM — N89.8 VAGINAL DISCHARGE IN THIRD TRIMESTER, ANTEPARTUM: Status: RESOLVED | Noted: 2019-06-24 | Resolved: 2019-07-04

## 2019-07-04 PROBLEM — O26.893 VAGINAL DISCHARGE IN THIRD TRIMESTER, ANTEPARTUM: Status: RESOLVED | Noted: 2019-06-24 | Resolved: 2019-07-04

## 2019-07-04 LAB
ABO + RH BLD: NORMAL
BASOPHILS # BLD: 0 K/UL (ref 0–0.1)
BASOPHILS NFR BLD: 0 % (ref 0–2)
BLOOD GROUP ANTIBODIES SERPL: NORMAL
DIFFERENTIAL METHOD BLD: ABNORMAL
EOSINOPHIL # BLD: 0 K/UL (ref 0–0.4)
EOSINOPHIL NFR BLD: 0 % (ref 0–5)
ERYTHROCYTE [DISTWIDTH] IN BLOOD BY AUTOMATED COUNT: 14.5 % (ref 11.6–14.5)
HCT VFR BLD AUTO: 33.3 % (ref 35–45)
HGB BLD-MCNC: 10.6 G/DL (ref 12–16)
LYMPHOCYTES # BLD: 1.3 K/UL (ref 0.9–3.6)
LYMPHOCYTES NFR BLD: 12 % (ref 21–52)
MCH RBC QN AUTO: 27.7 PG (ref 24–34)
MCHC RBC AUTO-ENTMCNC: 31.8 G/DL (ref 31–37)
MCV RBC AUTO: 86.9 FL (ref 74–97)
MONOCYTES # BLD: 0.8 K/UL (ref 0.05–1.2)
MONOCYTES NFR BLD: 7 % (ref 3–10)
NEUTS SEG # BLD: 9.1 K/UL (ref 1.8–8)
NEUTS SEG NFR BLD: 81 % (ref 40–73)
PLATELET # BLD AUTO: 234 K/UL (ref 135–420)
PMV BLD AUTO: 10 FL (ref 9.2–11.8)
RBC # BLD AUTO: 3.83 M/UL (ref 4.2–5.3)
SPECIMEN EXP DATE BLD: NORMAL
WBC # BLD AUTO: 11.3 K/UL (ref 4.6–13.2)

## 2019-07-04 PROCEDURE — 74011250636 HC RX REV CODE- 250/636: Performed by: OBSTETRICS & GYNECOLOGY

## 2019-07-04 PROCEDURE — 74011000250 HC RX REV CODE- 250

## 2019-07-04 PROCEDURE — 76060000078 HC EPIDURAL ANESTHESIA

## 2019-07-04 PROCEDURE — 85025 COMPLETE CBC W/AUTO DIFF WBC: CPT

## 2019-07-04 PROCEDURE — 75410000000 HC DELIVERY VAGINAL/SINGLE

## 2019-07-04 PROCEDURE — 86900 BLOOD TYPING SEROLOGIC ABO: CPT

## 2019-07-04 PROCEDURE — 65270000029 HC RM PRIVATE

## 2019-07-04 PROCEDURE — 75410000002 HC LABOR FEE PER 1 HR

## 2019-07-04 PROCEDURE — 74011250636 HC RX REV CODE- 250/636: Performed by: NURSE ANESTHETIST, CERTIFIED REGISTERED

## 2019-07-04 PROCEDURE — 0KQM0ZZ REPAIR PERINEUM MUSCLE, OPEN APPROACH: ICD-10-PCS | Performed by: ADVANCED PRACTICE MIDWIFE

## 2019-07-04 PROCEDURE — 74011250637 HC RX REV CODE- 250/637: Performed by: ADVANCED PRACTICE MIDWIFE

## 2019-07-04 PROCEDURE — 77030007879 HC KT SPN EPDRL TELE -B: Performed by: NURSE ANESTHETIST, CERTIFIED REGISTERED

## 2019-07-04 PROCEDURE — 75410000003 HC RECOV DEL/VAG/CSECN EA 0.5 HR

## 2019-07-04 PROCEDURE — 74011250637 HC RX REV CODE- 250/637: Performed by: OBSTETRICS & GYNECOLOGY

## 2019-07-04 PROCEDURE — 74011250636 HC RX REV CODE- 250/636

## 2019-07-04 PROCEDURE — 74011250636 HC RX REV CODE- 250/636: Performed by: ADVANCED PRACTICE MIDWIFE

## 2019-07-04 PROCEDURE — 77030034849

## 2019-07-04 RX ORDER — FENTANYL CITRATE 50 UG/ML
100 INJECTION, SOLUTION INTRAMUSCULAR; INTRAVENOUS ONCE
Status: COMPLETED | OUTPATIENT
Start: 2019-07-04 | End: 2019-07-04

## 2019-07-04 RX ORDER — MAG HYDROX/ALUMINUM HYD/SIMETH 200-200-20
15 SUSPENSION, ORAL (FINAL DOSE FORM) ORAL
Status: DISCONTINUED | OUTPATIENT
Start: 2019-07-04 | End: 2019-07-04 | Stop reason: HOSPADM

## 2019-07-04 RX ORDER — MISOPROSTOL 200 UG/1
800 TABLET ORAL
Status: DISCONTINUED | OUTPATIENT
Start: 2019-07-04 | End: 2019-07-04 | Stop reason: HOSPADM

## 2019-07-04 RX ORDER — ZOLPIDEM TARTRATE 5 MG/1
5 TABLET ORAL
Status: DISCONTINUED | OUTPATIENT
Start: 2019-07-04 | End: 2019-07-06 | Stop reason: HOSPADM

## 2019-07-04 RX ORDER — ACETAMINOPHEN 325 MG/1
650 TABLET ORAL
Status: DISCONTINUED | OUTPATIENT
Start: 2019-07-04 | End: 2019-07-06 | Stop reason: HOSPADM

## 2019-07-04 RX ORDER — FENTANYL CITRATE 50 UG/ML
INJECTION, SOLUTION INTRAMUSCULAR; INTRAVENOUS
Status: COMPLETED
Start: 2019-07-04 | End: 2019-07-04

## 2019-07-04 RX ORDER — TERBUTALINE SULFATE 1 MG/ML
0.25 INJECTION SUBCUTANEOUS
Status: DISCONTINUED | OUTPATIENT
Start: 2019-07-04 | End: 2019-07-04 | Stop reason: HOSPADM

## 2019-07-04 RX ORDER — LIDOCAINE HYDROCHLORIDE 10 MG/ML
30 INJECTION, SOLUTION EPIDURAL; INFILTRATION; INTRACAUDAL; PERINEURAL AS NEEDED
Status: DISCONTINUED | OUTPATIENT
Start: 2019-07-04 | End: 2019-07-04 | Stop reason: HOSPADM

## 2019-07-04 RX ORDER — ONDANSETRON 2 MG/ML
4 INJECTION INTRAMUSCULAR; INTRAVENOUS
Status: DISCONTINUED | OUTPATIENT
Start: 2019-07-04 | End: 2019-07-04 | Stop reason: HOSPADM

## 2019-07-04 RX ORDER — SALICYLIC ACID
90 POWDER (GRAM) MISCELLANEOUS ONCE
Status: COMPLETED | OUTPATIENT
Start: 2019-07-04 | End: 2019-07-04

## 2019-07-04 RX ORDER — METHYLERGONOVINE MALEATE 0.2 MG/ML
0.2 INJECTION INTRAVENOUS AS NEEDED
Status: DISCONTINUED | OUTPATIENT
Start: 2019-07-04 | End: 2019-07-04 | Stop reason: HOSPADM

## 2019-07-04 RX ORDER — CARBOPROST TROMETHAMINE 250 UG/ML
250 INJECTION, SOLUTION INTRAMUSCULAR
Status: DISCONTINUED | OUTPATIENT
Start: 2019-07-04 | End: 2019-07-04 | Stop reason: HOSPADM

## 2019-07-04 RX ORDER — HYDROCORTISONE 25 MG/G
CREAM TOPICAL
Status: DISCONTINUED | OUTPATIENT
Start: 2019-07-04 | End: 2019-07-06 | Stop reason: HOSPADM

## 2019-07-04 RX ORDER — LIDOCAINE HYDROCHLORIDE AND EPINEPHRINE 15; 5 MG/ML; UG/ML
INJECTION, SOLUTION EPIDURAL
Status: COMPLETED | OUTPATIENT
Start: 2019-07-04 | End: 2019-07-04

## 2019-07-04 RX ORDER — IBUPROFEN 400 MG/1
800 TABLET ORAL
Status: DISCONTINUED | OUTPATIENT
Start: 2019-07-04 | End: 2019-07-06 | Stop reason: HOSPADM

## 2019-07-04 RX ORDER — OXYTOCIN 10 [USP'U]/ML
10 INJECTION, SOLUTION INTRAMUSCULAR; INTRAVENOUS
Status: DISCONTINUED | OUTPATIENT
Start: 2019-07-04 | End: 2019-07-04 | Stop reason: HOSPADM

## 2019-07-04 RX ORDER — SODIUM CHLORIDE, SODIUM LACTATE, POTASSIUM CHLORIDE, CALCIUM CHLORIDE 600; 310; 30; 20 MG/100ML; MG/100ML; MG/100ML; MG/100ML
125 INJECTION, SOLUTION INTRAVENOUS CONTINUOUS
Status: DISCONTINUED | OUTPATIENT
Start: 2019-07-04 | End: 2019-07-04 | Stop reason: HOSPADM

## 2019-07-04 RX ORDER — OXYTOCIN/RINGER'S LACTATE 20/1000 ML
125 PLASTIC BAG, INJECTION (ML) INTRAVENOUS AS NEEDED
Status: DISCONTINUED | OUTPATIENT
Start: 2019-07-04 | End: 2019-07-04 | Stop reason: HOSPADM

## 2019-07-04 RX ORDER — MINERAL OIL
30 OIL (ML) ORAL AS NEEDED
Status: DISCONTINUED | OUTPATIENT
Start: 2019-07-04 | End: 2019-07-04 | Stop reason: HOSPADM

## 2019-07-04 RX ORDER — OXYTOCIN/0.9 % SODIUM CHLORIDE 30/500 ML
0-25 PLASTIC BAG, INJECTION (ML) INTRAVENOUS
Status: DISCONTINUED | OUTPATIENT
Start: 2019-07-04 | End: 2019-07-04

## 2019-07-04 RX ORDER — AMOXICILLIN 250 MG
1 CAPSULE ORAL
Status: DISCONTINUED | OUTPATIENT
Start: 2019-07-04 | End: 2019-07-06 | Stop reason: HOSPADM

## 2019-07-04 RX ORDER — NALBUPHINE HYDROCHLORIDE 10 MG/ML
10 INJECTION, SOLUTION INTRAMUSCULAR; INTRAVENOUS; SUBCUTANEOUS
Status: DISCONTINUED | OUTPATIENT
Start: 2019-07-04 | End: 2019-07-04 | Stop reason: HOSPADM

## 2019-07-04 RX ORDER — OXYTOCIN/RINGER'S LACTATE 20/1000 ML
999 PLASTIC BAG, INJECTION (ML) INTRAVENOUS AS NEEDED
Status: DISCONTINUED | OUTPATIENT
Start: 2019-07-04 | End: 2019-07-04 | Stop reason: HOSPADM

## 2019-07-04 RX ORDER — HYDROMORPHONE HYDROCHLORIDE 1 MG/ML
1 INJECTION, SOLUTION INTRAMUSCULAR; INTRAVENOUS; SUBCUTANEOUS
Status: DISCONTINUED | OUTPATIENT
Start: 2019-07-04 | End: 2019-07-04 | Stop reason: HOSPADM

## 2019-07-04 RX ORDER — FENTANYL CITRATE 50 UG/ML
INJECTION, SOLUTION INTRAMUSCULAR; INTRAVENOUS AS NEEDED
Status: DISCONTINUED | OUTPATIENT
Start: 2019-07-04 | End: 2019-07-04 | Stop reason: HOSPADM

## 2019-07-04 RX ORDER — PROMETHAZINE HYDROCHLORIDE 25 MG/ML
25 INJECTION, SOLUTION INTRAMUSCULAR; INTRAVENOUS
Status: DISCONTINUED | OUTPATIENT
Start: 2019-07-04 | End: 2019-07-06 | Stop reason: HOSPADM

## 2019-07-04 RX ORDER — OXYCODONE AND ACETAMINOPHEN 5; 325 MG/1; MG/1
2 TABLET ORAL
Status: DISCONTINUED | OUTPATIENT
Start: 2019-07-04 | End: 2019-07-06 | Stop reason: HOSPADM

## 2019-07-04 RX ORDER — BUTORPHANOL TARTRATE 1 MG/ML
2 INJECTION INTRAMUSCULAR; INTRAVENOUS
Status: DISCONTINUED | OUTPATIENT
Start: 2019-07-04 | End: 2019-07-04 | Stop reason: HOSPADM

## 2019-07-04 RX ORDER — PHENYLEPHRINE HCL IN 0.9% NACL 0.4MG/10ML
100 SYRINGE (ML) INTRAVENOUS AS NEEDED
Status: DISCONTINUED | OUTPATIENT
Start: 2019-07-04 | End: 2019-07-04 | Stop reason: HOSPADM

## 2019-07-04 RX ADMIN — CASTOR OIL 60 ML: 1 LIQUID ORAL at 10:00

## 2019-07-04 RX ADMIN — NALBUPHINE HYDROCHLORIDE 10 MG: 10 INJECTION, SOLUTION INTRAMUSCULAR; INTRAVENOUS; SUBCUTANEOUS at 05:22

## 2019-07-04 RX ADMIN — FENTANYL CITRATE 100 MCG: 50 INJECTION, SOLUTION INTRAMUSCULAR; INTRAVENOUS at 06:26

## 2019-07-04 RX ADMIN — IBUPROFEN 800 MG: 400 TABLET ORAL at 16:28

## 2019-07-04 RX ADMIN — FENTANYL CITRATE 100 MCG: 50 INJECTION, SOLUTION INTRAMUSCULAR; INTRAVENOUS at 06:27

## 2019-07-04 RX ADMIN — ACETAMINOPHEN 650 MG: 325 TABLET, FILM COATED ORAL at 21:00

## 2019-07-04 RX ADMIN — SODIUM CHLORIDE, SODIUM LACTATE, POTASSIUM CHLORIDE, AND CALCIUM CHLORIDE 1000 ML: 600; 310; 30; 20 INJECTION, SOLUTION INTRAVENOUS at 06:06

## 2019-07-04 RX ADMIN — OXYTOCIN-SODIUM CHLORIDE 0.9% IV SOLN 30 UNIT/500ML 2 MILLI-UNITS/MIN: 30-0.9/5 SOLUTION at 08:24

## 2019-07-04 RX ADMIN — Medication 19980 MILLI-UNITS/HR: at 10:03

## 2019-07-04 RX ADMIN — LIDOCAINE HYDROCHLORIDE AND EPINEPHRINE 5 ML: 15; 5 INJECTION, SOLUTION EPIDURAL at 06:21

## 2019-07-04 RX ADMIN — Medication 3 ML/HR: at 06:30

## 2019-07-04 RX ADMIN — SODIUM CHLORIDE, SODIUM LACTATE, POTASSIUM CHLORIDE, AND CALCIUM CHLORIDE 125 ML/HR: 600; 310; 30; 20 INJECTION, SOLUTION INTRAVENOUS at 04:10

## 2019-07-04 RX ADMIN — HYDROCORTISONE 2.5%: 25 CREAM TOPICAL at 16:30

## 2019-07-04 NOTE — ROUTINE PROCESS
TRANSFER - IN REPORT:    Verbal report received from 1101 Sonja RN(name) on [facility]  being received from L&D recovery(unit) for routine progression of care      Report consisted of patients Situation, Background, Assessment and   Recommendations(SBAR). Information from the following report(s) SBAR, Kardex, Procedure Summary, Intake/Output, MAR and Recent Results was reviewed with the receiving nurse. Opportunity for questions and clarification was provided. Assessment completed upon patients arrival to unit and care assumed. 1300--assessment done--even though patient ambulated to her room, advised her to call for my assistance when she gets up to the bathroom again--verbalizes understanding--po fluids encouraged--denies pain  1420--assisted out of bed to the bathroom and voided qs--had stop and start, but feels she emptied her bladder--deni care reviewed--back to bed--may be up on her own now--verbalizes understanding. 1830--vital signs stable--voiding qs--effective pain management with Motrin--breast feeding is going well  1900--report given to JEFFERSON Giron

## 2019-07-04 NOTE — PROGRESS NOTES
Pt  40w presents to L&D ambulatory w/ c/o contractions and some bloody show. Up to void and change in bathroom    0317-- /0  0325-- updated N. Pricilla Leventhal on pt and SVE. Admission process begun.  0606-- CRNA at bedside for epidural placement  0612-- procedural time out and start time  0620-- line in place  0621-- test dose   0655-- Rosado inserted  0710-- Bedside and Verbal shift change report given to Clay Hernadez RN (oncoming nurse) by Eh Iglesias RN (offgoing nurse). Report included the following information SBAR, Kardex, Intake/Output, MAR and Recent Results.

## 2019-07-04 NOTE — H&P
Obstetric Admission History and Physical    Name: Daryn Vazquez MRN: [id number] SSN: [id number]    YOB: 1990  Age: 33 y.o. Sex: female       Subjective:      Chief complaint:    Chief Complaint   Patient presents with   [place]arn[place]a Severance is a 33 y.o.  female, G 2 P 1 who presents at 40 weeks gestation with c/o contractions since 2000 on 7/3. Araujo Challenger On admission EFM strip is obtained and is Category 1.     OB HISTORY  Prenatal care started at 8 wks with [facility]. TVS supporting dates and viability. Problems of pregnancy include severe anemia, given 3 doses of sucrolose Iv with last hgb on 6/19 at 9.7. Total wt gain 31 lbs. GBS neg.     PAST PREGNANCY HISTORY  2015 41 wks VAVD F 6 lbs 7 oz epid 2nd degree lac / Dr Ferrell Pyo / SURGICAL HISTORY  Past Medical History:   Diagnosis Date    Anemia affecting pregnancy in third trimester 7/4/2019     Problem List as of 7/4/2019 Date Reviewed: 7/4/2019          Codes Class Noted - Resolved    * (Principal) Labor and delivery indication for care or intervention ICD-10-CM: O75.9  ICD-9-CM: 659.90  7/4/2019 - Present        Anemia affecting pregnancy in third trimester ICD-10-CM: O99.013  ICD-9-CM: 648.23, 285.9  7/4/2019 - Present        Hemorrhoids in pregnancy ICD-10-CM: O22.40  ICD-9-CM: 671.80, 455.6  7/4/2019 - Present        RESOLVED: 40 weeks gestation of pregnancy ICD-10-CM: Z3A.40  ICD-9-CM: V22.2  2/13/2014 - 6/17/2019              FAMILY/ SOCIAL HISTORY  Social History     Socioeconomic History    Marital status:      Spouse name: Not on file    Number of children: Not on file    Years of education: Not on file    Highest education level: Not on file   Occupational History    Not on file   Social Needs    Financial resource strain: Not on file    Food insecurity:     Worry: Not on file     Inability: Not on file    Transportation needs:     Medical: Not on file     Non-medical: Not on file   Tobacco Use    Smoking status: Never Smoker    Smokeless tobacco: Never Used   Substance and Sexual Activity    Alcohol use: No     Alcohol/week: 0.0 oz    Drug use: No    Sexual activity: Yes     Partners: Male   Lifestyle    Physical activity:     Days per week: Not on file     Minutes per session: Not on file    Stress: Not on file   Relationships    Social connections:     Talks on phone: Not on file     Gets together: Not on file     Attends Orthodox service: Not on file     Active member of club or organization: Not on file     Attends meetings of clubs or organizations: Not on file     Relationship status: Not on file    Intimate partner violence:     Fear of current or ex partner: Not on file     Emotionally abused: Not on file     Physically abused: Not on file     Forced sexual activity: Not on file   Other Topics Concern    Not on file   Social History Narrative    Not on file          ALLERGY:  Allergies   Allergen Reactions    Other Plant, Animal, Environmental Cough, Sneezing and Other (comments)     Seasonal allergies - sneezing, cough, itchy eyes         Review of Systems:  A comprehensive review of systems was negative      Objective:     VITAL SIGNS:  Visit Vitals  /69   Pulse 96       Physical Exam:  Abdomen: soft  Position of baby vtx  Estimated fetal weight 6 lbs  Contractions q 3 mins/mod quality  FHR baseline at  140 bpm/ variability mod/Category 1/accels  External Genitalia: normal general appearance without lesions  Cervix: Dilation: 4cm  Membranes  intact    Assessment:     1) 40 weeks Intrauterine Pregnancy .   2) labor management      Plan:     Reassuring fetal status, Labor  Progressing normally, Continue plan for vaginal delivery    Dr Farnaz Dalton MD  notified and aware of admission    Signed By:  Maria Elena Blancas CNM     July 4, 2019

## 2019-07-04 NOTE — ROUTINE PROCESS
Bedside and Verbal shift change report given to Bernard Martinez (oncoming nurse) by Shahbaz Moreno (offgoing nurse). Report included the following information SBAR and Kardex.

## 2019-07-04 NOTE — ANESTHESIA PREPROCEDURE EVALUATION
Relevant Problems   No relevant active problems       Anesthetic History   No history of anesthetic complications            Review of Systems / Medical History  Patient summary reviewed, nursing notes reviewed and pertinent labs reviewed    Pulmonary  Within defined limits                 Neuro/Psych   Within defined limits           Cardiovascular  Within defined limits                Exercise tolerance: >4 METS     GI/Hepatic/Renal  Within defined limits              Endo/Other  Within defined limits           Other Findings              Physical Exam    Airway  Mallampati: II  TM Distance: 4 - 6 cm  Neck ROM: normal range of motion   Mouth opening: Normal     Cardiovascular  Regular rate and rhythm,  S1 and S2 normal,  no murmur, click, rub, or gallop             Dental  No notable dental hx       Pulmonary  Breath sounds clear to auscultation               Abdominal  GI exam deferred       Other Findings            Anesthetic Plan    ASA: 2  Anesthesia type: epidural and general - backup            Anesthetic plan and risks discussed with: Patient

## 2019-07-04 NOTE — ANESTHESIA PROCEDURE NOTES
Epidural Block    Start time: 7/4/2019 6:06 AM  End time: 7/4/2019 6:35 AM  Performed by: Bryan Yeager CRNA  Authorized by: Bryan Yeager CRNA     Pre-Procedure  Indication: at surgeon's request, primary anesthetic and labor epidural    Preanesthetic Checklist: patient identified, risks and benefits discussed, anesthesia consent, site marked, patient being monitored, timeout performed and anesthesia consent      Epidural:   Patient position:  Seated  Prep region:  Lumbar  Prep: Betadine and Patient draped    Location:  L3-4    Needle and Epidural Catheter:   Needle Type:  Tuohy  Needle Gauge:  18 G  Injection Technique:  Loss of resistance using saline  Attempts:  1  Catheter Size:  20 G  Catheter at Skin Depth (cm):  7  Depth in Epidural Space (cm):  3  Events: no blood with aspiration, no cerebrospinal fluid with aspiration and no paresthesia    Test Dose:  Negative and lidocaine 1.5% w/ epi    Assessment:   Catheter Secured:  Tegaderm and tape  Insertion:  Uncomplicated  Patient tolerance:  Patient tolerated the procedure well with no immediate complications  For Vitals see nursing notes. Test dose made patient feel warm sensation in both legs and feet but had full motion. Load with 100 mcg fentanyl brought no changes. 3cc of 0.125% administered for a load and the patient had pressure sensation of contraction only. Orders changed to 3cc/hr for infusion.    PCEA button not given to patient at my request.      Laura Hays CRNA  6:59 AM

## 2019-07-04 NOTE — L&D DELIVERY NOTE
Delivery Summary    Patient: Alice Marshall MRN: [id number]  SSN: [id number]    YOB: 1990  Age: 33 y.o. Sex: female       Information for the patient's :  Karel jimenez [[id number]]       Labor Events:    Labor: No    Steroids: None   Cervical Ripening Date/Time:       Cervical Ripening Type: None   Antibiotics During Labor: No   Rupture Identifier: Sac 1    Rupture Date/Time: 2019 7:22 AM   Rupture Type: AROM   Amniotic Fluid Volume: Moderate    Amniotic Fluid Description: Meconium    Amniotic Fluid Odor: None    Induction: None       Induction Date/Time:        Indications for Induction:      Augmentation: Oxytocin   Augmentation Date/Time: 20198:30 AM   Indications for Augmentation: Ineffective Contraction Pattern   Labor complications: None       Additional complications:        J7Y0 Baby Girl. Barbie Masters pushed well for a spontaneous vaginal delivery of a viable female infant in [facility] in a splash of lightly stained meconium fluid to maternal abdomen. Nursery to the bedside stimulating infant. Peds requests infant. Cord double clamped and cut by me for handoff. More stimulation on the warmer. Apgars 8/9 by peds. Placenta delivered spontaneously and intact with a 3VC via schultze mechanism. Mec staining noted. 2nd degree repaired with 3-0 vicryl under anesthetic of the epidural. EBL 350cc. Dyad stable and recovering in the birthing room. Dr Kanika Mansfield notified of the birth.  Berna Bishop CNM      Delivery Events:  Indications For Episiotomy:     Episiotomy: None   Perineal Laceration(s): 2nd   Repaired:     Periurethral Laceration Location:      Repaired:     Labial Laceration Location:     Repaired:     Sulcal Laceration Location:     Repaired:     Vaginal Laceration Location:     Repaired:     Cervical Laceration Location:     Repaired:     Repair Suture: Vicryl 3-0   Number of Repair Packets: 1   Estimated Blood Loss (ml): 350ml     Delivery Date: 2019    Delivery Time: 10:02 AM  Delivery Type: Vaginal, Spontaneous  Sex:  Female    Gestational Age: 37w0d   Delivery Clinician:  Sierra Winkler  Living Status: Living   Delivery Location: L&D            APGARS  One minute Five minutes Ten minutes   Skin color: 0   1        Heart rate: 2   2        Grimace: 2   2        Muscle tone: 2   2        Breathin   2        Totals: 8   9            Presentation: Vertex    Position: Right Occiput Anterior  Resuscitation Method:  Tactile Stimulation;Suctioning-bulb     Meconium Stained: Thin      Cord Information: 3 Vessels  Complications: None  Cord around:    Delayed cord clamping? Yes  Cord clamped date/time:2019 10:14 AM  Disposition of Cord Blood: Lab    Blood Gases Sent?: No    Placenta:  Date/Time: 2019 10:10 AM  Removal: Spontaneous      Appearance: Normal      Measurements:  Birth Weight:        Birth Length:        Head Circumference:        Chest Circumference:       Abdominal Girth: Other Providers:   ABBEY MURCIA;LIONEL BARROSO;ALYSSA NELSON, Primary Nurse;Primary [place] Nurse;Pediatrician;Midwife           Group B Strep:   Lab Results   Component Value Date/Time    GrBStrep, External Negative 2019     Information for the patient's :  BG tony Anthony [[id number]]   No results found for: ABORH, PCTABR, PCTDIG, BILI, ABORHEXT, ABORH    No results for input(s): PCO2CB, PO2CB, HCO3I, SO2I, IBD, PTEMPI, SPECTI, PHICB, ISITE, IDEV, IALLEN in the last 72 hours.

## 2019-07-04 NOTE — PROGRESS NOTES
Labor Progress Note  Jena Anthony feels pressure and urge to push. Pelvic exam:       Cervical Exam  Dilation (cm): 10  Eff: 100 %  Station: 0  Vaginal exam done by? : N Meagan nmw  Membrane Status: AROM  Reassuring Category 1 tracing  UCs q 2-3    Fetal Heart rate:   Patient Vitals for the past 4 hrs:    Mode Fetal Heart Rate Variability Decelerations Accelerations   07/04/19 0900 External 123 6-25 BPM None No   07/04/19 0845 External 123 6-25 BPM None No   07/04/19 0830 External 123 6-25 BPM None Yes   07/04/19 0815 External 125 6-25 BPM None No   07/04/19 0800 External 125 6-25 BPM Early No   07/04/19 0745 -- 125 6-25 BPM Early Yes   07/04/19 0730 External 120 6-25 BPM None No   07/04/19 0715 External 120 6-25 BPM Early No   07/04/19 0700 External 125 6-25 BPM None No   07/04/19 0630 External 125 6-25 BPM None No   07/04/19 0600 External 125 6-25 BPM None No       Patient Vitals for the past 4 hrs:   Temp Pulse Resp BP   07/04/19 0915 -- 91 -- 99/58   07/04/19 0900 98 °F (36.7 °C) 86 16 115/64   07/04/19 0845 -- 87 -- 105/56   07/04/19 0830 -- 88 -- 108/51   07/04/19 0815 -- 83 -- 117/54   07/04/19 0800 -- 83 -- 112/45   07/04/19 0745 -- 81 -- 111/49   07/04/19 0730 98.7 °F (37.1 °C) 89 16 123/65   07/04/19 0726 -- 86 -- 137/59   07/04/19 0724 -- 96 -- (!) 140/91   07/04/19 0722 -- 94 -- 136/83   07/04/19 0720 -- 94 -- 137/79   07/04/19 0718 -- 89 -- 131/73   07/04/19 0716 -- 89 -- 131/62   07/04/19 0714 -- 100 -- 128/60   07/04/19 0712 -- 94 -- 110/61   07/04/19 0710 -- -- -- 112/66   07/04/19 0708 -- 87 -- 97/83   07/04/19 0706 -- 92 -- 141/83   07/04/19 0704 -- 99 -- 132/72   07/04/19 0702 -- (!) 105 -- 132/73   07/04/19 0701 -- 93 -- --   07/04/19 0659 -- 94 -- 149/74   07/04/19 0654 -- 92 -- 135/67   07/04/19 0652 -- 93 -- 137/66   07/04/19 0650 -- 91 -- 130/78   07/04/19 0648 -- 93 -- 141/66   07/04/19 0646 -- 85 -- 129/71   07/04/19 0644 -- 89 -- 138/59   07/04/19 0642 -- (!) 102 -- 127/55 19 0640 -- 100 -- 118/65   19 0638 -- 99 -- 125/69   19 0636 -- 92 -- 132/78   19 0634 -- (!) 101 -- 123/65   19 0632 -- 96 -- 135/63   19 0630 -- 88 -- 140/68   19 0628 -- (!) 102 -- 127/65   19 0627 -- (!) 101 -- 117/83   19 0626 -- 95 -- 122/66   19 0600 -- 90 -- 123/68           Temp (24hrs), Av.2 °F (36.8 °C), Min:97.9 °F (36.6 °C), Max:98.7 °F (37.1 °C)        Recent Labs     19  0402   WBC 11.3   HGB 10.6*        Assessment: Second stage/ Reassuring maternal and fetal status. Plan:Begin pushing. Anticipate .    Andrae Vásquez, ANJEL  2019  9:44 AM

## 2019-07-04 NOTE — PROGRESS NOTES
Labor Progress Note  Patient seen, fetal heart rate and contraction pattern evaluated, patient examined. Patient Vitals for the past 1 hrs:   BP Pulse   07/04/19 0418 119/69 96       Physical Exam:  Cervical Exam:  6 cm dilated    70% effaced    -2 station    Membranes:  Intact  Uterine Activity: Frequency: Every 3-5 minutes  Fetal Heart Rate: Baseline: 130 per minute  Variability: moderate  Accelerations: yes    Assessment/Plan:  Reassuring fetal status   C/o back pain; 1 cm progress in 1 hr. Desires epidural with intensity of back pain as debilitating. Dereck Manuel awaiting labs.

## 2019-07-04 NOTE — PROGRESS NOTES
Labor Progress Note  Patient seen, fetal heart rate and contraction pattern evaluated, patient examined. Lab Results   Component Value Date/Time    WBC 11.3 07/04/2019 04:02 AM    HGB 10.6 (L) 07/04/2019 04:02 AM    HCT 33.3 (L) 07/04/2019 04:02 AM    PLATELET 697 36/49/1975 04:02 AM    MCV 86.9 07/04/2019 04:02 AM       Physical Exam:  Cervical Exam:  8 cm dilated    70% effaced    -2 station    Membranes:  AROM of green meconium  Uterine Activity: Frequency: Every 6-8 minutes  Fetal Heart Rate: Baseline: 140 per minute    Assessment/Plan:  Labor  Not progressing normally  start pitocin augmentation   8 cms after AROm of green mec.  Ctx q 6-8 mins so will start pit Glorine Canter is comfortable with epidural. Dr Sp Coyle informed of status

## 2019-07-05 LAB
HCT VFR BLD AUTO: 31.3 % (ref 35–45)
HGB BLD-MCNC: 9.9 G/DL (ref 12–16)

## 2019-07-05 PROCEDURE — 85014 HEMATOCRIT: CPT

## 2019-07-05 PROCEDURE — 74011250637 HC RX REV CODE- 250/637: Performed by: ADVANCED PRACTICE MIDWIFE

## 2019-07-05 PROCEDURE — 65270000029 HC RM PRIVATE

## 2019-07-05 PROCEDURE — 85018 HEMOGLOBIN: CPT

## 2019-07-05 PROCEDURE — 36415 COLL VENOUS BLD VENIPUNCTURE: CPT

## 2019-07-05 RX ADMIN — IBUPROFEN 800 MG: 400 TABLET ORAL at 02:08

## 2019-07-05 RX ADMIN — ACETAMINOPHEN 650 MG: 325 TABLET, FILM COATED ORAL at 13:09

## 2019-07-05 RX ADMIN — ACETAMINOPHEN 650 MG: 325 TABLET, FILM COATED ORAL at 02:11

## 2019-07-05 RX ADMIN — IBUPROFEN 800 MG: 400 TABLET ORAL at 13:09

## 2019-07-05 NOTE — ROUTINE PROCESS
Bedside shift change report given to hal bennett rn (oncoming nurse) by Vanna Sharif rn (offgoing nurse). Report included the following information SBAR, Kardex and MAR.

## 2019-07-05 NOTE — PROGRESS NOTES
PPD # 1    Patient doing well post-partum without significant complaint. Voiding without difficulty, normal lochia. Breastfeeding well. Baby stable. Vitals:    Patient Vitals for the past 8 hrs:   BP Temp Pulse Resp SpO2   19 0800 111/66 97.7 °F (36.5 °C) 72 17 100 %     Temp (24hrs), Av.4 °F (36.9 °C), Min:97.7 °F (36.5 °C), Max:99.2 °F (37.3 °C)      Vital signs stable, afebrile. Exam:  Patient without distress. Breasts intact and nontender               Abdomen soft, fundus firm at level of umbilicus, nontender               Perineum with normal lochia noted. Lower extremities are negative for swelling, cords or tenderness. Lab/Data Review:  Lab Results   Component Value Date/Time    WBC 11.3 2019 04:02 AM    HGB 9.9 (L) 2019 06:00 AM    HCT 31.3 (L) 2019 06:00 AM    PLATELET 193  04:02 AM    MCV 86.9 2019 04:02 AM       Assessment and Plan:  Patient appears to be having uncomplicated post-partum course. Continue routine perineal care and maternal education. Plan discharge tomorrow if no problems occur.     Sunny Limon CNM  2019  8:48 AM

## 2019-07-05 NOTE — LACTATION NOTE
Mom feeding baby, cradle hold. Baby is positioned and latched well. Mom states baby is nursing well, has a strong suck. Mom tried to nurse first child but, she never really latched - mom pumped about 1 month. Mom hopes to exclusively breast feed this baby. Discussed colostrum, sucking needs, nursing expectations, latch, milk coming in, cluster feeding. Offered help with feedings. Info sheet, daily log and resource list given. Encouraged to call with questions.

## 2019-07-05 NOTE — ANESTHESIA POSTPROCEDURE EVALUATION
* No procedures listed *.    epidural, general - backup    Anesthesia Post Evaluation      Multimodal analgesia: multimodal analgesia not used between 6 hours prior to anesthesia start to PACU discharge  Patient location during evaluation: floor  Patient participation: complete - patient participated  Level of consciousness: awake  Pain score: 0  Pain management: adequate  Airway patency: patent  Anesthetic complications: no  Cardiovascular status: acceptable  Respiratory status: acceptable  Hydration status: acceptable  Post anesthesia nausea and vomiting:  none      No vitals data found for the desired time range.

## 2019-07-06 VITALS
SYSTOLIC BLOOD PRESSURE: 108 MMHG | OXYGEN SATURATION: 100 % | HEART RATE: 70 BPM | DIASTOLIC BLOOD PRESSURE: 74 MMHG | TEMPERATURE: 98.2 F | RESPIRATION RATE: 16 BRPM

## 2019-07-06 PROBLEM — Z34.90 PREGNANCY: Status: RESOLVED | Noted: 2019-07-04 | Resolved: 2019-07-06

## 2019-07-06 PROCEDURE — 74011250637 HC RX REV CODE- 250/637: Performed by: ADVANCED PRACTICE MIDWIFE

## 2019-07-06 RX ADMIN — IBUPROFEN 800 MG: 400 TABLET ORAL at 04:40

## 2019-07-06 RX ADMIN — ACETAMINOPHEN 650 MG: 325 TABLET, FILM COATED ORAL at 04:40

## 2019-07-06 NOTE — PROGRESS NOTES
Progress Note    Patient: Nora Ovalles MRN: [id number]  SSN: [id number]    YOB: 1990  Age: 33 y.o. Sex: female      Subjective:       Pt denies complaints. Ambulating, voiding, tolerating regular diet, minimal lochia, pain controlled with po pain meds. Breastfeeding without difficulty. She is ready to go home today. Objective:      Patient Vitals for the past 12 hrs:   Temp Pulse Resp BP SpO2   19 0440 98.2 °F (36.8 °C) 74 18 105/64 100 %       Physical Exam:  General:  Alert and oriented, no apparent distress  Abdomen:  Soft, non distended, non tender to palpation, firm fundus below umbilicus  Lower extremities bilaterally:  Non tender to palpation, no edema/cyanosis/clubbing/redness    Lab/Data Review:  Hgb 10.6 -> 9.9     -> 9.9Assessment:     33 y.o. [id number] PPD#2 s/p  at 40.0wks with labor  Patient Active Problem List   Diagnosis Code    Anemia affecting pregnancy in third trimester O99.013    Hemorrhoids in pregnancy O22.40    Postpartum care following vaginal delivery Z39.2    Second degree perineal laceration O70.1       Plan:     Postpartum care discussed including diet, ambulation, and actvitiy restrictions. - Pain well controlled  - DC home today, discharge instructions and precautions reviewed.       Signed By: Jing Manley MD     2019

## 2019-07-06 NOTE — ROUTINE PROCESS
Verbal shift change report given to William Rajan RN (oncoming nurse) by Val Gaxiola RN (offgoing nurse). Report included the following information SBAR, Procedure Summary, MAR and Recent Results. 5352 - RN in room for vitals. Pt sitting up in recliner when RN entered room. Pt denies questions/concerns regarding plan of care or care for herself and baby. 1350 - discharge teaching done for mom and baby. Education provided on umbilical care d/t questions from mom. Mom verbalized understanding. No other questions/concerns regarding mom's care or care of infant.

## 2019-07-06 NOTE — ROUTINE PROCESS
Verbal shift change report given to Gia Goss RN by Rell Chilel RN. Report included the following information SBAR, Kardex, Intake/Output, MAR, Accordion, Recent Results and Med Rec Status.

## 2019-07-06 NOTE — DISCHARGE INSTRUCTIONS

## 2019-07-06 NOTE — LACTATION NOTE
Mother states baby is nursing well but her nipples are sore. Reviewed latch, positioning, colostrum, feeding frequency, diapers, milk coming in, hind milk, pumping, supply and demand and nipple care. Lots of discussion, questions answered. Encouraged to ask for assistance if needed.

## 2019-07-06 NOTE — DISCHARGE SUMMARY
Obstetrical Discharge Summary     Name: Kai Maharaj MRN: [id number]  SSN: [id number]    YOB: 1990  Age: 33 y.o. Sex: female      Admit Date: 2019    Discharge Date: 2019     Admitting Physician: Chad Harvey MD     Attending Physician:  James Canseco MD     * Admission Diagnoses: Pregnancy [Z34.90]    * Discharge Diagnoses:   Information for the patient's :  Mohammed Gilford  Shahzad Collins [[id number]]   Delivery of a 2.97 kg female infant via Vaginal, Spontaneous on 2019 at 10:02 AM  by Rocio Reid. Apgars were 8  and 9 . Additional Diagnoses:   Hospital Problems as of 2019 Date Reviewed: 2019          Codes Class Noted - Resolved POA    * (Principal) Postpartum care following vaginal delivery ICD-10-CM: Z39.2  ICD-9-CM: V24.2  2019 - Present No        Second degree perineal laceration ICD-10-CM: O70.1  ICD-9-CM: 664.10  2019 - Present No        Anemia affecting pregnancy in third trimester ICD-10-CM: O99.013  ICD-9-CM: 648.23, 285.9  2019 - Present Yes        RESOLVED: Labor and delivery indication for care or intervention ICD-10-CM: O75.9  ICD-9-CM: 659.90  2019 - 2019 Yes        RESOLVED: Pregnancy ICD-10-CM: Z34.90  ICD-9-CM: V22.2  2019 - 2019 Unknown             Lab Results   Component Value Date/Time    ABO/Rh(D) Mireille Bowen POSITIVE 2019 04:02 AM    Rubella, External Immune 2018    GrBStrep, External Negative 2019    ABO,Rh O Positive 2018      Immunization History   Administered Date(s) Administered    Tdap 2019       * Procedures:          * Discharge Condition: stable    * Hospital Course: Normal hospital course following the delivery. * Disposition: Home    Discharge Medications:   Current Discharge Medication List      CONTINUE these medications which have NOT CHANGED    Details   ferrous sulfate 325 mg (65 mg iron) tablet Take 1 Tab by mouth two (2) times a day.   Qty: 60 Tab, Refills: 3 Associated Diagnoses: Other iron deficiency anemia      prenatal multivit-ca-min-fe-fa tab Take  by mouth. * Follow-up Care/Patient Instructions:   Activity: Activity as tolerated, No sex for 6 weeks and No heavy lifting for 6 weeks  Diet: Regular Diet    Follow-up Information     Follow up With Specialties Details Why [place], [facility]  In 6 weeks  [address] [facility]  [phone]           Signed By:  Rosaura Shore CNM     July 6, 2019

## 2019-09-05 ENCOUNTER — HOSPITAL ENCOUNTER (OUTPATIENT)
Dept: LAB | Age: 29
Discharge: HOME | End: 2019-09-05
Payer: OTHER GOVERNMENT

## 2019-09-05 ENCOUNTER — OFFICE VISIT (OUTPATIENT)
Dept: FAMILY MEDICINE CLINIC | Age: 29
End: 2019-09-05

## 2019-09-05 VITALS
WEIGHT: 129.4 LBS | DIASTOLIC BLOOD PRESSURE: 72 MMHG | RESPIRATION RATE: 20 BRPM | OXYGEN SATURATION: 99 % | TEMPERATURE: 99.9 F | SYSTOLIC BLOOD PRESSURE: 110 MMHG | HEART RATE: 92 BPM | HEIGHT: 62 IN | BODY MASS INDEX: 23.81 KG/M2

## 2019-09-05 DIAGNOSIS — M67.911 DYSFUNCTION OF RIGHT ROTATOR CUFF: Primary | ICD-10-CM

## 2019-09-05 DIAGNOSIS — D50.9 IRON DEFICIENCY ANEMIA, UNSPECIFIED IRON DEFICIENCY ANEMIA TYPE: ICD-10-CM

## 2019-09-05 DIAGNOSIS — L70.0 ACNE VULGARIS: ICD-10-CM

## 2019-09-05 LAB
BASOPHILS # BLD: 0 K/UL (ref 0–0.1)
BASOPHILS NFR BLD: 0 % (ref 0–2)
DIFFERENTIAL METHOD BLD: ABNORMAL
EOSINOPHIL # BLD: 0.1 K/UL (ref 0–0.4)
EOSINOPHIL NFR BLD: 2 % (ref 0–5)
ERYTHROCYTE [DISTWIDTH] IN BLOOD BY AUTOMATED COUNT: 13.7 % (ref 11.6–14.5)
HCT VFR BLD AUTO: 37.9 % (ref 35–45)
HGB BLD-MCNC: 12.4 G/DL (ref 12–16)
LYMPHOCYTES # BLD: 1.2 K/UL (ref 0.9–3.6)
LYMPHOCYTES NFR BLD: 36 % (ref 21–52)
MCH RBC QN AUTO: 27.9 PG (ref 24–34)
MCHC RBC AUTO-ENTMCNC: 32.7 G/DL (ref 31–37)
MCV RBC AUTO: 85.2 FL (ref 74–97)
MONOCYTES # BLD: 0.2 K/UL (ref 0.05–1.2)
MONOCYTES NFR BLD: 7 % (ref 3–10)
NEUTS SEG # BLD: 1.8 K/UL (ref 1.8–8)
NEUTS SEG NFR BLD: 55 % (ref 40–73)
PLATELET # BLD AUTO: 324 K/UL (ref 135–420)
PMV BLD AUTO: 10.4 FL (ref 9.2–11.8)
RBC # BLD AUTO: 4.45 M/UL (ref 4.2–5.3)
WBC # BLD AUTO: 3.4 K/UL (ref 4.6–13.2)

## 2019-09-05 PROCEDURE — 83540 ASSAY OF IRON: CPT

## 2019-09-05 PROCEDURE — 85025 COMPLETE CBC W/AUTO DIFF WBC: CPT

## 2019-09-05 PROCEDURE — 36415 COLL VENOUS BLD VENIPUNCTURE: CPT

## 2019-09-05 PROCEDURE — 82728 ASSAY OF FERRITIN: CPT

## 2019-09-05 RX ORDER — BENZOYL PEROXIDE 2.5 G/100G
GEL TOPICAL
Qty: 60 G | Refills: 6 | Status: SHIPPED | OUTPATIENT
Start: 2019-09-05 | End: 2021-05-10

## 2019-09-05 NOTE — PROGRESS NOTES
1. Have you been to the ER, urgent care clinic since your last visit? Hospitalized since your last visit? No    2. Have you seen or consulted any other health care providers outside of the [address] [place] [address] since your last visit? Include any pap smears or colon screening.  No
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
## 2019-09-05 NOTE — PATIENT INSTRUCTIONS
Healthy Lifestyle Choices    The choices you make every day determine whether you are likely to stay healthy or be at risk of developing or worsening chronic diseases like diabetes and high blood pressure. Weight: Your Body mass index is 23.67 kg/m². Body mass index or BMI is only an estimate and does not include factors such as your muscle mass. In general it is healthy to have a BMI between 18.5 and 25. Nutrition: I recommend a diet high in vegetables, low in carbs and sugars, and low in processed foods. The Mediterranean diet is a good guideline for most people even if you are already at a healthy weight. If you would like more guidance, schedule an appointment with me to discuss your nutrition. Hydration: Drink about 8-10 cups of water daily. Avoid soda and other sweetened beverages. Exercise: Aim for at least 30 minutes of physical activity 5 days a week. If you have been sedentary for a while, start with light exercise and gradually increase your activity. Exercise should be something you enjoy and can easily incorporate into your daily routine. Examples: a walk at lunchtime, playing outdoors with your kids, swimming, dancing, yoga, etc.     Sleep: Most people need 7-8 hours of uninterrupted sleep. Talk to me if you are having problems sleeping. Stress: Get at least 20-30 minutes of relaxation daily such as meditation, time with family and friends, pets, exercise, hobbies, etc.     Seatbelt: Always wear your seatbelt! Sunscreen: A little sun exposure (10-20 minutes per day depending on your skin tone) supports healthy vitamin D levels, but avoid excessive sun exposure and sunburn. Dental care: Get a dental check-up and cleaning every 6 months.

## 2019-09-06 LAB
FERRITIN SERPL-MCNC: 96 NG/ML (ref 8–388)
IRON SATN MFR SERPL: 46 %
IRON SERPL-MCNC: 123 UG/DL (ref 50–175)
TIBC SERPL-MCNC: 269 UG/DL (ref 250–450)

## 2019-09-25 ENCOUNTER — HOSPITAL ENCOUNTER (OUTPATIENT)
Dept: PHYSICAL THERAPY | Age: 29
Discharge: HOME | End: 2019-09-25
Payer: OTHER GOVERNMENT

## 2019-09-25 PROCEDURE — 97162 PT EVAL MOD COMPLEX 30 MIN: CPT

## 2019-09-25 PROCEDURE — 97110 THERAPEUTIC EXERCISES: CPT
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
## 2019-10-02 ENCOUNTER — HOSPITAL ENCOUNTER (OUTPATIENT)
Dept: PHYSICAL THERAPY | Age: 29
Discharge: HOME | End: 2019-10-02
Payer: OTHER GOVERNMENT

## 2019-10-02 PROCEDURE — 97110 THERAPEUTIC EXERCISES: CPT

## 2019-10-02 NOTE — PROGRESS NOTES
PHYSICAL THERAPY - DAILY TREATMENT NOTE    Patient Name: Jose Arauz        Date: 10/2/2019  : 1990   YES Patient  Verified  Visit #:   2   of     Insurance: Payor: GLENNY / Plan: Nolan Quarles 74 / Product Type:  /      In time: 07:36 Out time: 08:26   Total Treatment Time: 50     Medicare/BCBS Time Tracking (below)   Total Timed Codes (min):  NA 1:1 Treatment Time:  NA     TREATMENT AREA = Right shoulder pain [M25.511]    SUBJECTIVE    Pain Level (on 0 to 10 scale):  3  / 10   Medication Changes/New allergies or changes in medical history, any new surgeries or procedures? NO    If yes, update Summary List   Subjective Functional Status/Changes:  []  No changes reported     \"I was able to go back to the gym and do a light work out. \"          OBJECTIVE    Therapeutic Procedures:  Min Procedure Specifics + Rationale   n/a [x]  Patient Education (performed throughout session) [x] Review HEP    [] Progressed/Changed HEP based on:   [] proper performance and advancement of Therex/TA   [] reduction in pain level    [] increased functional capacity       [] change in directional preference   50 [x] Therapeutic Exercise   [x]  See Flowsheet   Rationale: increase ROM and increase strength to improve the patients ability to participate in ADL's      Other Objective/Functional Measures:    PT initiated program based on deficits noted during initial evaluation. Mod vcs for proper exercise form. PT updated HEP, will f/u for compliance [place]  See flow sheet for more details. Progressed therex per flow sheet. Post Treatment Pain Level (on 0 to 10) scale:  2   10     ASSESSMENT    Assessment/Changes in Function:     Pt tolerated first session post initial evaluation well with mild fatigue noted towards end range of sets.     []  See Plan of Care  []  See Progress Note/ Recertification  []  See Discharge Summary        Patient will continue to benefit from skilled PT services to modify and progress therapeutic interventions, address functional mobility deficits, address ROM deficits, address strength deficits, analyze and address soft tissue restrictions, analyze and cue movement patterns, analyze and modify body mechanics/ergonomics, assess and modify postural abnormalities, address imbalance/dizziness and instruct in home and community integration  to attain remaining goals   Progress toward goals / Updated goals:    See POC     PLAN    [x]  Upgrade activities as tolerated  [x]  Update interventions per flow sheet YES Continue plan of care   []  Discharge due to :    []  Other:      Therapist: Edison Vega DPT    Date: 10/2/2019 Time: 7:18 AM     Future Appointments   Date Time Provider Ofelia Gamez   10/2/2019  7:30 AM Gerri Barbosa PT [facility]   10/9/2019 10:00 AM [facility]   10/16/2019 10:00 AM [facility]   10/23/2019 10:00 AM Jaspreet Fletcher PTA [facility]   10/30/2019  8:00 AM Alexys Evangelista [facility]

## 2019-10-09 ENCOUNTER — APPOINTMENT (OUTPATIENT)
Dept: PHYSICAL THERAPY | Age: 29
End: 2019-10-09
Payer: OTHER GOVERNMENT

## 2019-10-16 ENCOUNTER — HOSPITAL ENCOUNTER (OUTPATIENT)
Dept: PHYSICAL THERAPY | Age: 29
Discharge: HOME | End: 2019-10-16
Payer: OTHER GOVERNMENT

## 2019-10-16 ENCOUNTER — APPOINTMENT (OUTPATIENT)
Dept: PHYSICAL THERAPY | Age: 29
End: 2019-10-16
Payer: OTHER GOVERNMENT

## 2019-10-16 PROCEDURE — 97110 THERAPEUTIC EXERCISES: CPT

## 2019-10-23 ENCOUNTER — HOSPITAL ENCOUNTER (OUTPATIENT)
Dept: PHYSICAL THERAPY | Age: 29
Discharge: HOME | End: 2019-10-23
Payer: OTHER GOVERNMENT

## 2019-10-23 PROCEDURE — 97110 THERAPEUTIC EXERCISES: CPT

## 2019-10-23 NOTE — PROGRESS NOTES
[facility] PHYSICAL THERAPY  [address] [place] [address] [place], [address] - Phone: [phone]  Fax: [phone]  PROGRESS NOTE  Patient Name: Eric Alarcon : 1990   Treatment/Medical Diagnosis: Right shoulder pain [M25.511]   Referral Source: Josefa Elizalde NP     Date of Initial Visit: 2019 Attended Visits: 4 Missed Visits: 0     SUMMARY OF TREATMENT  Treatment has consisted of ROM and strengthening exercises for the right shoulder and scapular musculature, patient education, and home exercise program.  CURRENT STATUS  Patient has received 1 eval and 3 PT sessions focusing on right shoulder and scapular strength. Her strength is progressing but she continues to demonstrate positive impingement signs in right shoulder (see below). Her FOTO (Focused on Therapeutic Outcomes) functional status score improved by 5 points since eval, indicating increased tolerance with functional activities. Shoulder Strength:                                                                           L (1-5) R (1-5) Pain   Flexors 5 4 [] Yes   [x] No   Abductors 5 4 [] Yes   [x] No   External Rotators 5 5 [] Yes   [x] No   Internal Rotators 5 5 [] Yes   [x] No   Extension 4+ 4 [] Yes   [x] No   Serratus Anterior 4+ 4- [] Yes   [] No       ULTT: Median nerve: (+)  Neer's (+)  Hawkin's-Ag: (+)  Emtpy can: (+) for weakness but no pain    Goal/Measure of Progress Goal Met? 1. Pt will be independent with HEP at D/C for self management. Status at last Eval: NA Current Status: Complaint with HEP progressing   2. Pt will return to her prior recreational activity level at least 3x weekly in order to return to PLOF. Status at last Eval: Unable  Current Status: NT n/a   3. Pt to increase FOTO score to > 76 to indicate improved functional independence. Status at last Eval: 59/100 Current Status: 64/100 progressing   4.   Pt will increase right shoulder flexion to at least 4+/5 in order to ease return to [facility] ADL's and functional activities. Status at last Eval: 4-/5 Current Status: 4/5 progressing     New Goals to be achieved in __3-4__  weeks:  Continue with above goals. RECOMMENDATIONS  Continue skilled PT services 1-2x/week for 3-4 more weeks to further increase strength in the right shoulder and scapular musculature to decrease pain with ADL's and functional mobility. If you have any questions/comments please contact us directly at [phone]. Thank you for allowing us to assist in the care of your patient. Therapist Signature: Ronnie Hensley PT Date: 10/23/2019     Time: 12:51 PM   NOTE TO PHYSICIAN:  PLEASE COMPLETE THE ORDERS BELOW AND FAX TO   [facility] Physical Therapy: [phone]. If you are unable to process this request in 24 hours please contact our office: [phone].    ___ I have read the above report and request that my patient continue as recommended.   ___ I have read the above report and request that my patient continue therapy with the following changes/special instructions:_________________________________________________________   ___ I have read the above report and request that my patient be discharged from therapy.      Physician Signature:        Date:       Time:

## 2019-10-23 NOTE — PROGRESS NOTES
PHYSICAL THERAPY - DAILY TREATMENT NOTE    Patient Name: Chichi Solares        Date: 10/23/2019  : 1990   YES Patient  Verified  Visit #:   4   of   -8  Insurance: Payor: GLENNY / Plan: Nolan Quarles 74 / Product Type:  /      In time: 7:41 Out time: 8:40   Total Treatment Time: 59     Medicare/BCBS [place] Time Tracking (below)   Total Timed Codes (min):  NA 1:1 Treatment Time:  NA     TREATMENT AREA =  Right shoulder pain [M25.511]    SUBJECTIVE    Pain Level (on 0 to 10 scale):    / 10   Medication Changes/New allergies or changes in medical history, any new surgeries or procedures?    no    If yes, update Summary List   Subjective Functional Status/Changes:  []  No changes reported     \"I have noticed increased strength in my right UE. I have also noticed that donning/doffing jackets are painful. \"  \"I am nursing my infant daughter and have noticed increased milk production on my right side\" (states she had previously noticed a decrease in milk production on that side). OBJECTIVE    Modalities Rationale:  decrease pain and increase tissue extensibility to improve patient's ability to perform ADL's and functional mobility with decreased pain.      min [] Estim, type/location:                                      []  att     []  unatt     []  w/US     []  w/ice    []  w/heat    min []  Mechanical Traction: type/lbs                   []  pro   []  sup   []  int   []  cont    []  before manual    []  after manual    min []  Ultrasound, settings/location:      min []  Iontophoresis w/ dexamethasone, location:                                               []  take home patch       []  in clinic   5 min []  Ice     [x]  Heat    location/position: MHP to right shoulder    min []  Vasopneumatic Device, press/temp:     min []  Other:    [x] Skin assessment post-treatment (if applicable):    [x]  intact    []  redness- no adverse reaction     []redness - adverse reaction: 54 min Therapeutic Exercise:  [x]  See flow sheet   Rationale:      increase ROM and increase strength to improve the patients ability to perform ADL's and functional mobility with right UE and decreased pain. min Patient Education:  YES  Reviewed HEP   []  Progressed/Changed HEP based on: Added theraband rows/ext and IR/ER to HEP     Other Objective/Functional Measures:    Shoulder Strength:                                                                           L (1-5) R (1-5) Pain   Flexors 5 4 [] Yes   [x] No   Abductors 5 4 [] Yes   [x] No   External Rotators 5 5 [] Yes   [x] No   Internal Rotators 5 5 [] Yes   [x] No   Extension 4+ 4 [] Yes   [x] No   Serratus Anterior 4+ 4- [] Yes   [] No      ULTT: Median nerve: (+)  Neer's (+)  Hawkin's-Ag: (+)  Emtpy can: (+) for weakness but no pain    FOTO = 64/100     Post Treatment Pain Level (on 0 to 10) scale:   0  / 10     ASSESSMENT    Assessment/Changes in Function:     Mild scapular winging on right noted with prone exercise series. []  See Progress Note/Recertification   Patient will continue to benefit from skilled PT services to modify and progress therapeutic interventions, address functional mobility deficits, address ROM deficits, address strength deficits, analyze and address soft tissue restrictions, analyze and cue movement patterns, analyze and modify body mechanics/ergonomics, assess and modify postural abnormalities and instruct in home and community integration to attain remaining goals.      Progress toward goals / Updated goals:    See PN to MD     PLAN    [x]  Upgrade activities as tolerated YES Continue plan of care   []  Discharge due to :    []  Other:      Therapist: Sam Sanchez PT    Date: 10/23/2019 Time: 7:44 AM     Future Appointments   Date Time Provider Ofelia Gamez   10/30/2019  8:00 AM [address] [place] [address]   11/6/2019  7:30 AM Donavon Vera PT [facility]   11/13/2019  7:30 AM Vanna Emmett Lamas, PT [facility]   11/20/2019  7:30 AM Bette Moran, PT [facility]   11/27/2019  7:30 AM Bette Moran, PT [facility]

## 2019-10-30 ENCOUNTER — HOSPITAL ENCOUNTER (OUTPATIENT)
Dept: PHYSICAL THERAPY | Age: 29
Discharge: HOME | End: 2019-10-30
Payer: OTHER GOVERNMENT

## 2019-10-30 PROCEDURE — 97110 THERAPEUTIC EXERCISES: CPT

## 2019-10-30 PROCEDURE — 97140 MANUAL THERAPY 1/> REGIONS: CPT

## 2019-10-30 NOTE — PROGRESS NOTES
PHYSICAL THERAPY - DAILY TREATMENT NOTE    Patient Name: Raymond Bunn        Date: 10/30/2019  : 1990   YES Patient  Verified  Visit #:      of   11  Insurance: Payor: GLENNY / Plan: Nolan Quarles 74 / Product Type:  /      In time: 09:30 Out time: 10:15   Total Treatment Time: 45     Medicare/BCBS Time Tracking (below)   Total Timed Codes (min):  NA 1:1 Treatment Time:  NA     TREATMENT AREA = Right shoulder pain [M25.511]    SUBJECTIVE    Pain Level (on 0 to 10 scale):  5-6  / 10   Medication Changes/New allergies or changes in medical history, any new surgeries or procedures? NO    If yes, update Summary List   Subjective Functional Status/Changes:  []  No changes reported     \"I woke up today and my shoulder felt stiff and some more pain\"          OBJECTIVE    Therapeutic Procedures:  Min Procedure Specifics + Rationale   n/a [x]  Patient Education (performed throughout session) [x] Review HEP    [] Progressed/Changed HEP based on:   [] proper performance and advancement of Therex/TA   [] reduction in pain level    [] increased functional capacity       [] change in directional preference   35 [x] Therapeutic Exercise   [x]  See Flowsheet   Rationale: increase ROM and increase strength to improve the patients ability to participate in ADL's      10 min Manual Therapy:  Right shoulder PROm in all planes, STM/DTm along bilateral c/s paraspinals and bilateral UT stretch   Rationale: decrease pain, increase tissue extensibility and decrease trigger points to improve patient's ability to perform ADL's with greater ease and less pain. Other Objective/Functional Measures:    Pt added seated lat stretch,manual therapy and increased reps/sets/resistance as noted on the flow sheet. Mod vcs for proper exercise form. Pt reported decreased pain post manual therapy  See flow sheet for more details. Progressed therex per flow sheet.    Post Treatment Pain Level (on 0 to 10) scale:   3  / 10     ASSESSMENT    Assessment/Changes in Function:     PT noted increased tissue turgor and TTP along right UT and c/s paraspinals compared to left. []  See Plan of Care  []  See Progress Note/ Recertification  []  See Discharge Summary        Patient will continue to benefit from skilled PT services to modify and progress therapeutic interventions, address functional mobility deficits, address ROM deficits, address strength deficits, analyze and address soft tissue restrictions, analyze and cue movement patterns, analyze and modify body mechanics/ergonomics, assess and modify postural abnormalities, address imbalance/dizziness and instruct in home and community integration  to attain remaining goals   Progress toward goals / Updated goals:    No progress to report as this was first visit post re-evaluation.       PLAN    [x]  Upgrade activities as tolerated  [x]  Update interventions per flow sheet YES Continue plan of care   []  Discharge due to :    []  Other:      Therapist: Wilfredo Booth DPT    Date: 10/30/2019 Time: 10:03 AM     Future Appointments   Date Time Provider Ofelia Gamez   11/6/2019  7:30 AM Kevin Michelle [facility]   11/13/2019  7:30 AM Kevin Michelle PT [facility]   11/20/2019  7:30 AM Kevin Michelle [facility]   11/27/2019  7:30 AM Kevin Michelle [facility]

## 2019-11-06 ENCOUNTER — APPOINTMENT (OUTPATIENT)
Dept: PHYSICAL THERAPY | Age: 29
End: 2019-11-06

## 2020-02-12 ENCOUNTER — OFFICE VISIT (OUTPATIENT)
Dept: FAMILY MEDICINE CLINIC | Age: 30
End: 2020-02-12

## 2020-02-12 ENCOUNTER — HOSPITAL ENCOUNTER (OUTPATIENT)
Dept: LAB | Age: 30
Discharge: HOME | End: 2020-02-12
Payer: OTHER GOVERNMENT

## 2020-02-12 VITALS
TEMPERATURE: 96.8 F | DIASTOLIC BLOOD PRESSURE: 76 MMHG | RESPIRATION RATE: 20 BRPM | SYSTOLIC BLOOD PRESSURE: 108 MMHG | BODY MASS INDEX: 23.45 KG/M2 | WEIGHT: 127.4 LBS | HEART RATE: 78 BPM | HEIGHT: 62 IN | OXYGEN SATURATION: 99 %

## 2020-02-12 DIAGNOSIS — R11.0 NAUSEA: ICD-10-CM

## 2020-02-12 DIAGNOSIS — M65.4 DE QUERVAIN'S TENOSYNOVITIS, RIGHT: ICD-10-CM

## 2020-02-12 DIAGNOSIS — K92.1 TARRY STOOL: Primary | ICD-10-CM

## 2020-02-12 LAB — HCG UR QL: NEGATIVE

## 2020-02-12 PROCEDURE — 81025 URINE PREGNANCY TEST: CPT

## 2020-02-12 NOTE — PATIENT INSTRUCTIONS
Pepcid 20 mg twice a day      De Quervain's Tenosynovitis: Care Instructions  Your Care Instructions  Pat Frausto (say \"Ever\") tenosynovitis is a problem that makes the bottom of your thumb and the side of your wrist hurt. When you have de Quervain's, the ropey fiber (tendon) that helps move your thumb away from your fingers becomes swollen. You may have pain when you move your wrist or pick things up. You may hear a creaking sound when you move your wrist or thumb. Symptoms often get better in a few weeks with home care. Your doctor may want you to start some gentle stretching exercises once your symptoms are gone. Sometimes treatment with an injection or surgery is needed. Follow-up care is a key part of your treatment and safety. Be sure to make and go to all appointments, and call your doctor if you are having problems. It's also a good idea to know your test results and keep a list of the medicines you take. How can you care for yourself at home? · Until your symptoms are better, stop the activities that caused the pain. · Avoid moving the hand and wrist that hurt. · Follow your doctor's directions for wearing a splint to keep your thumb and wrist from moving. · Try ice or heat. ? Put ice or a cold pack on your thumb and wrist for 10 to 20 minutes at a time. Put a thin cloth between the ice and your skin. ? You can use heat for 20 to 30 minutes, 2 or 3 times a day. Try using a heating pad, hot shower, or hot pack. · Ask your doctor if you can take an over-the-counter pain medicine, such as acetaminophen (Tylenol), ibuprofen (Advil, Motrin), or naproxen (Aleve). Be safe with medicines. Read and follow all instructions on the label. When should you call for help?   Watch closely for changes in your health, and be sure to contact your doctor if:    · You have new or worse pain.     · You have new or worse numbness or tingling in your hand or fingers.     · Your hand feels weaker.     · You do not get better as expected. Where can you learn more? Go to http://citlaly-joy.info/. Enter M195 in the search box to learn more about \"De Quervain's Tenosynovitis: Care Instructions. \"  Current as of: June 26, 2019  Content Version: 12.2  © 1932-8244 [employer]. Care instructions adapted under license by [employer] (which disclaims liability or warranty for this information). If you have questions about a medical condition or this instruction, always ask your healthcare professional. [address] any warranty or liability for your use of this information.

## 2020-02-12 NOTE — PROGRESS NOTES
Room #  19  Chief Complaint:  Nausea  Diarrhea    HPI:    Karen Mtata is a 26 y.o. female who presents today for c/o nausea and unusual bowel movement  1. Have you been to the ER, urgent care clinic since your last visit? Hospitalized since your last visit? NO When:    2. Have you seen or consulted any other health care providers outside of the [address] [place] [address] since your last visit? Include any pap smears or colon screening. NO  When :  Reason:    Health Maintenance reviewed Yes    Health Maintenance Due   Topic Date Due    Influenza Age 5 to Adult  08/01/2019

## 2020-02-12 NOTE — PROGRESS NOTES
Subjective     Patient ID:  Arben Barbosa is a 26 y.o. ( 1990) female who presents for the following:   No chief complaint on file. HPI     GI:   Presents with a 5-day history of intermittent nausea and fullness with any food intake, BMs more frequent. A few days ago had stool which was black, looser than usual, and had mucus. No current NSAID use. Not taking iron supplement currently. She does eat spinach but not in excessive amounts. She did start a different oral contraceptive about 9 days ago. Wrist pain:  Right wrist pain started about 4 months ago. She is right-handed. She has a 9month-old baby. The pain is located on the radial aspect of the wrist.  It is worse with lifting. Better with rest.  She has used a wrist brace which helped a little. She went to an urgent care 2 months ago for the pain. X-ray was normal.        Review of Systems   Constitutional: Negative for fatigue and fever. HENT: Negative for congestion and sore throat. Respiratory: Negative for cough. Cardiovascular: Negative for chest pain and palpitations. Gastrointestinal: Positive for abdominal pain and nausea. Negative for constipation, diarrhea, rectal pain and vomiting. Tarry stool, mucus in stool   Genitourinary: Negative for dysuria. Musculoskeletal: Positive for arthralgias (right wrist per HPI). Negative for back pain. Neurological: Negative for dizziness and headaches. Past Medical History, Past Surgery History, Allergies, Social History, and Family History were reviewed and updated.       Patient Active Problem List   Diagnosis Code    Anemia affecting pregnancy in third trimester O99.013    Hemorrhoids in pregnancy O22.40    Postpartum care following vaginal delivery Z39.2    Second degree perineal laceration O70.1     Past Medical History:   Diagnosis Date    Anemia affecting pregnancy in third trimester 2019     Patient Care Team:  April Ferro NP as PCP - General (Nurse Practitioner)  Anny Garcia MD as Surgeon (General Surgery)  Jodee Borges MD (Surgery)    History reviewed. No pertinent surgical history. Family History   Problem Relation Age of Onset    Asthma Mother     Cancer Father     Diabetes Maternal Aunt     Diabetes Maternal Grandmother     Hypertension Maternal Grandmother     Kidney Disease Maternal Grandmother         dialysis     Social History     Tobacco Use    Smoking status: Never Smoker    Smokeless tobacco: Never Used   Substance Use Topics    Alcohol use: No     Alcohol/week: 0.0 standard drinks    Drug use: No     Allergies   Allergen Reactions    Other Plant, Animal, Environmental Cough, Sneezing and Other (comments)     Seasonal allergies - sneezing, cough, itchy eyes       Current Outpatient Medications on File Prior to Visit   Medication Sig Dispense Refill    ferrous sulfate 325 mg (65 mg iron) tablet Take 1 Tab by mouth two (2) times a day. 60 Tab 3    prenatal multivit-ca-min-fe-fa tab Take  by mouth.  benzoyl peroxide 2.5 % topical gel Apply  to affected area nightly. 60 g 6     No current facility-administered medications on file prior to visit. There are no preventive care reminders to display for this patient. Objective     Visit Vitals  /76 (BP 1 Location: Right arm, BP Patient Position: Sitting)   Pulse 78   Temp 96.8 °F (36 °C) (Oral)   Resp 20   Ht 5' 2\" (1.575 m)   Wt 127 lb 6.4 oz (57.8 kg)   SpO2 99%   Breastfeeding Yes Comment: Patient could not remember   BMI 23.30 kg/m²     No LMP recorded. (Menstrual status: Breastfeeding). Physical Exam  Constitutional:       General: She is not in acute distress. Appearance: Normal appearance. She is well-developed. She is not diaphoretic. Cardiovascular:      Rate and Rhythm: Normal rate and regular rhythm. Heart sounds: Normal heart sounds. No murmur.    Pulmonary:      Effort: Pulmonary effort is normal. No respiratory distress. Breath sounds: Normal breath sounds. Abdominal:      General: Bowel sounds are normal. There is no distension or abdominal bruit. Palpations: Abdomen is soft. There is no fluid wave, mass or pulsatile mass. Tenderness: There is abdominal tenderness (mild) in the left upper quadrant and left lower quadrant. There is no rebound. Negative signs include Fontanez's sign and McBurney's sign. Hernia: No hernia is present. Musculoskeletal:      Right wrist: She exhibits normal range of motion, no tenderness, no bony tenderness, no swelling, no effusion, no crepitus, no deformity and no laceration. Comments: Right wrist: Finkelstein maneuver positive   Neurological:      Mental Status: She is alert and oriented to person, place, and time. LABS     TESTS      Assessment and Plan     1. Tarry stool  Concern for gastric or duodenal ulcer. Check stool for occult blood. Start Pepcid twice daily. Avoid NSAIDs. If bloody stool, or continued tarry stool, dizziness, weakness, etc. then proceed immediately to ER.  - OCCULT BLOOD, STOOL; Future    2. Nausea  Rule out pregnancy  - HCG URINE, QL; Future    3. De Quervain's tenosynovitis, right  Rest, ice, compression, OTC analgesics although avoid NSAIDs for now due to GI issues. Follow-up and Dispositions    · Return if symptoms worsen or fail to improve. Risks, benefits, and alternatives of the medications and treatment plan prescribed today were discussed, and patient expressed understanding. Printed after visit summary was given to patient and reviewed. All patient questions and concerns were addressed. Plan follow-up as discussed or as needed if any worsening symptoms or change in condition.            Signed electronically by Rajwinder Garcia DNP, FERDINAND-BC

## 2020-06-10 NOTE — PROGRESS NOTES
Cadence Sommer is a 28 y.o.  female and presents with     Chief Complaint   Patient presents with    Physical    Anemia    Mass     Pt is here to establish care. Has a 3year old child. NO smoking or alcohol. Pt has a lump on her back for past 4 years that is gradually growing. She was told in the past that it is benign. It hurts sometimes  No wt loss or gain  No smoking or alcohol. See obgyn regularly  Pt says she had TDA less than 10 years back. Pt also has acne . Uses Dove soap. Puts oil on her hair. Gets worse during menstrual cycle, takes birth control pills. No past medical history on file. No past surgical history on file. Current Outpatient Prescriptions   Medication Sig    norgestimate-ethinyl estradiol ([facility], ,) 0.25-35 mg-mcg tab Take 1 Tab by mouth daily.  benzoyl peroxide 5 % topical gel Apply  to affected area nightly. apply to affected area as directed    prenatal multivit-ca-min-fe-fa tab Take  by mouth.  ferrous sulfate (IRON) 325 mg (65 mg iron) tablet Take  by mouth Daily (before breakfast).  ibuprofen (MOTRIN) 800 mg tablet You may take this three times a day for only 2-3 days. No current facility-administered medications for this visit. Health Maintenance   Topic Date Due    DTaP/Tdap/Td series (1 - Tdap) 08/21/2010    Influenza Age 5 to Adult  08/01/2018    PAP AKA CERVICAL CYTOLOGY  12/05/2020     There is no immunization history for the selected administration types on file for this patient. Patient's last menstrual period was 07/11/2018. Allergies and Intolerances:    Allergies   Allergen Reactions    Other Plant, Animal, Environmental Cough, Sneezing and Other (comments)     Seasonal allergies - sneezing, cough, itchy eyes         Family History:   Family History   Problem Relation Age of Onset    Asthma Mother     Diabetes Maternal Aunt     Diabetes Maternal Grandmother     Hypertension Maternal Grandmother     Kidney Disease Maternal Grandmother      dialysis    Cancer Father        Social History:   She  reports that she has never smoked. She has never used smokeless tobacco.  She  reports that she does not drink alcohol. Review of Systems:   General: negative for - chills, fatigue, fever, weight change  Psych: negative for - anxiety, depression, irritability or mood swings  ENT: negative for - headaches, hearing change, nasal congestion, oral lesions, sneezing or sore throat  Heme/ Lymph: negative for - bleeding problems, bruising, pallor or swollen lymph nodes  Endo: negative for - hot flashes, polydipsia/polyuria or temperature intolerance  Resp: negative for - cough, shortness of breath or wheezing  CV: negative for - chest pain, edema or palpitations  GI: negative for - abdominal pain, change in bowel habits, constipation, diarrhea or nausea/vomiting  : negative for - dysuria, hematuria, incontinence, pelvic pain or vulvar/vaginal symptoms  MSK: negative for - joint pain, joint swelling or muscle pain  Neuro: negative for - confusion, headaches, seizures or weakness  Derm: negative for - dry skin, hair changes, rash or skin lesion changes, pos for lump on back          Physical:   Vitals:   Vitals:    08/13/18 1520   BP: 114/63   Pulse: 74   Resp: 16   Temp: 98.8 °F (37.1 °C)   TempSrc: Oral   SpO2: 98%   Weight: 132 lb 6.4 oz (60.1 kg)   Height: 5' 4\" (1.626 m)           Exam:   HEENT- atraumatic,normocephalic, awake, oriented, well nourished, acne on face, ? Miliaria crystallina  Neck - supple,no enlarged lymph nodes, no JVD, no thyromegaly  Chest- CTA, no rhonchi, no crackles  Heart- rrr, no murmurs / gallop/rub  Abdomen- soft,BS+,NT, no hepatosplenomegaly  Ext - no c/c/edema   Neuro- no focal deficits. Power 5/5 all extremities  Skin - warm,dry, no obvious rashes, well defined soft lump on over th rt upper back - lipoma , mildly tender           Review of Data:   LABS:   Lab Results   Component Value Date/Time    WBC 13.4 (H) 02/13/2014 04:35 PM    HGB 7.6 (L) 02/15/2014 06:30 AM    HCT 23.5 (L) 02/15/2014 06:30 AM    PLATELET 372 55/74/5695 04:35 PM     No results found for: NA, K, CL, CO2, GLU, BUN, CREA  No results found for: CHOL, CHOLX, CHLST, CHOLV, HDL, LDL, LDLC, DLDLP, TGLX, TRIGL, TRIGP  No results found for: GPT        Impression / Plan:        ICD-10-CM ICD-9-CM    1. Annual physical exam Z00.00 V70.0 CBC WITH AUTOMATED DIFF      TSH 3RD GENERATION      METABOLIC PANEL, COMPREHENSIVE      LIPID PANEL   2. Acne vulgaris L70.0 706.1 benzoyl peroxide 5 % topical gel   3. Lipoma of other specified sites D17.79 214.8 REFERRAL TO GENERAL SURGERY     Avoid applying oil to the scalp. Wash face three - four times daily    Follow up with Obgyn, self breast exam monthly. Explained to patient risk benefits of the medications. Advised patient to stop meds if having any side effects. Pt verbalized understanding of the instructions. I have discussed the diagnosis with the patient and the intended plan as seen in the above orders. The patient has received an after-visit summary and questions were answered concerning future plans. I have discussed medication side effects and warnings with the patient as well. I have reviewed the plan of care with the patient, accepted their input and they are in agreement with the treatment goals. Reviewed plan of care. Patient has provided input and agrees with goals.     Follow-up Disposition: Not on Israel Aschoff, MD 4 weeks

## 2021-01-19 ENCOUNTER — HOSPITAL ENCOUNTER (OUTPATIENT)
Dept: LAB | Age: 31
Discharge: HOME | End: 2021-01-19
Payer: OTHER GOVERNMENT

## 2021-01-19 PROCEDURE — 87624 HPV HI-RISK TYP POOLED RSLT: CPT

## 2021-01-19 PROCEDURE — 88175 CYTOPATH C/V AUTO FLUID REDO: CPT

## 2021-05-10 ENCOUNTER — OFFICE VISIT (OUTPATIENT)
Dept: FAMILY MEDICINE CLINIC | Age: 31
End: 2021-05-10
Payer: OTHER GOVERNMENT

## 2021-05-10 ENCOUNTER — HOSPITAL ENCOUNTER (OUTPATIENT)
Dept: LAB | Age: 31
Discharge: HOME | End: 2021-05-10

## 2021-05-10 VITALS
HEART RATE: 76 BPM | HEIGHT: 62 IN | RESPIRATION RATE: 16 BRPM | WEIGHT: 134 LBS | DIASTOLIC BLOOD PRESSURE: 74 MMHG | SYSTOLIC BLOOD PRESSURE: 111 MMHG | TEMPERATURE: 98.2 F | BODY MASS INDEX: 24.66 KG/M2

## 2021-05-10 DIAGNOSIS — K58.0 IRRITABLE BOWEL SYNDROME WITH DIARRHEA: ICD-10-CM

## 2021-05-10 DIAGNOSIS — Z00.00 ANNUAL PHYSICAL EXAM: Primary | ICD-10-CM

## 2021-05-10 DIAGNOSIS — D17.1 LIPOMA OF BACK: ICD-10-CM

## 2021-05-10 LAB — XX-LABCORP SPECIMEN COL,LCBCF: NORMAL

## 2021-05-10 PROCEDURE — 99001 SPECIMEN HANDLING PT-LAB: CPT

## 2021-05-10 PROCEDURE — 99395 PREV VISIT EST AGE 18-39: CPT | Performed by: FAMILY MEDICINE

## 2021-05-10 RX ORDER — LUBIPROSTONE 8 UG/1
8 CAPSULE, GELATIN COATED ORAL
Qty: 30 CAP | Refills: 1 | Status: SHIPPED | OUTPATIENT
Start: 2021-05-10 | End: 2021-06-10 | Stop reason: CLARIF

## 2021-05-10 NOTE — PROGRESS NOTES
Felicia Sung is a 31 y.o.  female and presents with    Chief Complaint   Patient presents with    Complete Physical       Subjective: Well Adult Physical   Patient here for a comprehensive physical exam.The patient reports problems - diarrhea which has been present for several weeks after an episode of food poisoning; she has malodorous 4-5 bowel movements per day  She has lump on back which has been present for 6 years; it has increased in size and causes discomfort. Do you take any herbs or supplements that were not prescribed by a doctor? yes Are you taking calcium supplements? no Are you taking aspirin daily? not applicable    ROS   General ROS: negative for - chills, fatigue or fever  Psychological ROS: negative for - anxiety or depression  Ophthalmic ROS: positive for - uses glasses  ENT ROS: negative for - headaches, nasal congestion or sore throat  Endocrine ROS: negative for - polydipsia/polyuria, skin changes or temperature intolerance  Respiratory ROS: no cough, shortness of breath, or wheezing  Cardiovascular ROS: no chest pain or dyspnea on exertion  Genito-Urinary ROS: no dysuria, trouble voiding, or hematuria  Musculoskeletal ROS: negative for - joint pain or muscle pain  Neurological ROS: no TIA or stroke symptoms  Dermatological ROS: negative for - rash or skin lesion changes    All other systems reviewed and are negative. Objective:  Vitals:    05/10/21 0929   BP: 111/74   Pulse: 76   Resp: 16   Temp: 98.2 °F (36.8 °C)   TempSrc: Temporal   Weight: 134 lb (60.8 kg)   Height: 5' 2\" (1.575 m)   PainSc:   0 - No pain   LMP: 05/04/2021     BMI 24.51 kg/m²       General appearance  alert, cooperative, no distress, appears stated age   Head  Normocephalic, without obvious abnormality, atraumatic   Eyes  conjunctivae/corneas clear. PERRL, EOM's intact. Ears  normal TM's and external ear canals AU   Nose Nares normal. Septum midline.  Mucosa normal. No drainage or sinus tenderness. Throat Lips, mucosa, and tongue normal. Teeth and gums normal   Neck supple, symmetrical, trachea midline, no adenopathy, thyroid: not enlarged, symmetric, no tenderness/mass/nodules, no carotid bruit and no JVD   Back   symmetric, no curvature. ROM normal. No CVA tenderness   Lungs   clear to auscultation bilaterally   Breasts  no masses, tenderness   Heart  regular rate and rhythm, S1, S2 normal, no murmur, click, rub or gallop   Abdomen   soft, non-tender. Bowel sounds normal. No masses,  No organomegaly   Pelvic Deferred   Extremities extremities normal, atraumatic, no cyanosis or edema   Pulses 2+ and symmetric   Skin Skin color, texture, turgor normal. No rashes or lesions   Lymph nodes Cervical, supraclavicular, and axillary nodes normal.   Neurologic Normal     LABS     TESTS      Assessment/Plan:    1. Annual physical exam  Reviewed preventive recommendations  - CBC WITH AUTOMATED DIFF; Future  - TSH 3RD GENERATION; Future    2. Lipoma of back  Multiple attempts for excision have been canceled; pt desires removal so she will be referred to surgery due to size of lesion  - REFERRAL TO SURGERY    3. Irritable bowel syndrome with diarrhea  Start antispasmotic; assess for thyroid disease  - CBC WITH AUTOMATED DIFF; Future  - TSH 3RD GENERATION; Future  - lubiPROStone (AMITIZA) 8 mcg capsule; Take 1 Cap by mouth daily (with breakfast). Dispense: 30 Cap; Refill: 1      Lab review: orders written for new lab studies as appropriate; see orders      I have discussed the diagnosis with the patient and the intended plan as seen in the above orders. The patient has received an after-visit summary and questions were answered concerning future plans. I have discussed medication side effects and warnings with the patient as well. I have reviewed the plan of care with the patient, accepted their input and they are in agreement with the treatment goals.

## 2021-05-10 NOTE — PROGRESS NOTES
Roscoe Davenport presents today for   Chief Complaint   Patient presents with    Complete Physical       Is someone accompanying this pt? no    Is the patient using any DME equipment during OV? no    Depression Screening:  3 most recent PHQ Screens 5/10/2021   Little interest or pleasure in doing things Not at all   Feeling down, depressed, irritable, or hopeless Not at all   Total Score PHQ 2 0       Learning Assessment:  Learning Assessment 8/13/2018   PRIMARY LEARNER Patient   HIGHEST LEVEL OF EDUCATION - PRIMARY LEARNER  4 YEARS OF COLLEGE   BARRIERS PRIMARY LEARNER NONE   CO-LEARNER CAREGIVER No   PRIMARY LANGUAGE ENGLISH   LEARNER PREFERENCE PRIMARY LISTENING   ANSWERED BY patient   RELATIONSHIP SELF       Abuse Screening:  Abuse Screening Questionnaire 8/13/2018   Do you ever feel afraid of your partner? N   Are you in a relationship with someone who physically or mentally threatens you? N   Is it safe for you to go home? Y       Fall Risk  No flowsheet data found. Health Maintenance reviewed and discussed and ordered per Provider. Health Maintenance Due   Topic Date Due    COVID-19 Vaccine (1) Never done   . Coordination of Care:  1. Have you been to the ER, urgent care clinic since your last visit? Hospitalized since your last visit? no    2. Have you seen or consulted any other health care providers outside of the [address] [place] [address] since your last visit? Include any pap smears or colon screening.  no      Last  Checked na  Last UDS Checked na  Last Pain contract signed: na    complete physical examination  anxiety

## 2021-05-11 LAB
BASOPHILS # BLD AUTO: 0.1 X10E3/UL (ref 0–0.2)
BASOPHILS NFR BLD AUTO: 1 %
EOSINOPHIL # BLD AUTO: 0.1 X10E3/UL (ref 0–0.4)
EOSINOPHIL NFR BLD AUTO: 2 %
ERYTHROCYTE [DISTWIDTH] IN BLOOD BY AUTOMATED COUNT: 11.9 % (ref 11.7–15.4)
HCT VFR BLD AUTO: 40.6 % (ref 34–46.6)
HGB BLD-MCNC: 12.6 G/DL (ref 11.1–15.9)
IMM GRANULOCYTES # BLD AUTO: 0 X10E3/UL (ref 0–0.1)
IMM GRANULOCYTES NFR BLD AUTO: 0 %
LYMPHOCYTES # BLD AUTO: 1.5 X10E3/UL (ref 0.7–3.1)
LYMPHOCYTES NFR BLD AUTO: 21 %
MCH RBC QN AUTO: 27 PG (ref 26.6–33)
MCHC RBC AUTO-ENTMCNC: 31 G/DL (ref 31.5–35.7)
MCV RBC AUTO: 87 FL (ref 79–97)
MONOCYTES # BLD AUTO: 0.4 X10E3/UL (ref 0.1–0.9)
MONOCYTES NFR BLD AUTO: 5 %
NEUTROPHILS # BLD AUTO: 5 X10E3/UL (ref 1.4–7)
NEUTROPHILS NFR BLD AUTO: 71 %
PLATELET # BLD AUTO: 332 X10E3/UL (ref 150–450)
RBC # BLD AUTO: 4.67 X10E6/UL (ref 3.77–5.28)
TSH SERPL DL<=0.005 MIU/L-ACNC: 0.7 UIU/ML (ref 0.45–4.5)
WBC # BLD AUTO: 7 X10E3/UL (ref 3.4–10.8)

## 2021-05-24 ENCOUNTER — OFFICE VISIT (OUTPATIENT)
Dept: SURGERY | Age: 31
End: 2021-05-24
Payer: OTHER GOVERNMENT

## 2021-05-24 VITALS
SYSTOLIC BLOOD PRESSURE: 111 MMHG | OXYGEN SATURATION: 97 % | DIASTOLIC BLOOD PRESSURE: 62 MMHG | BODY MASS INDEX: 24.87 KG/M2 | HEART RATE: 72 BPM | TEMPERATURE: 98.8 F | WEIGHT: 136 LBS

## 2021-05-24 DIAGNOSIS — R22.2 MASS ON BACK: ICD-10-CM

## 2021-05-24 DIAGNOSIS — M79.89 SOFT TISSUE MASS: Primary | ICD-10-CM

## 2021-05-24 PROCEDURE — 99214 OFFICE O/P EST MOD 30 MIN: CPT | Performed by: SURGERY

## 2021-05-24 NOTE — PROGRESS NOTES
General Surgery Consult    Rhina Poole  Admit date: (Not on file)    MRN: [id number]     : 1990     Age: 31 y.o. Attending Physician: Azra Andrews MD, [facility]      History of Present Illness:      Jose A Schwarz is a 31 y.o. female I have seen about 3 years ago for evaluation of a right shoulder/back mass. At that point the plan was to proceed with surgical excision but the patient had to cancel because she was pregnant. She stated now that the mass has increased in size and her  noticed that as well and she stated that sometimes it is bothering her. She said that today the mass is causing her some pain and discomfort because she had a long weekend on the beach and she had to hold multiple bags and pull a stroller and she feels the pain at the site of the lipoma. She denies any fever or chills. Patient Active Problem List    Diagnosis Date Noted    Postpartum care following vaginal delivery 2019    Second degree perineal laceration 2019    Anemia affecting pregnancy in third trimester 2019    Hemorrhoids in pregnancy 2019     Past Medical History:   Diagnosis Date    Anemia affecting pregnancy in third trimester 2019      History reviewed. No pertinent surgical history. Social History     Tobacco Use    Smoking status: Never Smoker    Smokeless tobacco: Never Used   Substance Use Topics    Alcohol use: No     Alcohol/week: 0.0 standard drinks      Social History     Tobacco Use   Smoking Status Never Smoker   Smokeless Tobacco Never Used     Family History   Problem Relation Age of Onset    Asthma Mother     Cancer Father     Diabetes Maternal Aunt     Diabetes Maternal Grandmother     Hypertension Maternal Grandmother     Kidney Disease Maternal Grandmother         dialysis      Current Outpatient Medications   Medication Sig    lubiPROStone (AMITIZA) 8 mcg capsule Take 1 Cap by mouth daily (with breakfast).     ferrous sulfate 325 mg (65 mg iron) tablet Take 1 Tab by mouth two (2) times a day.  prenatal multivit-ca-min-fe-fa tab Take  by mouth. No current facility-administered medications for this visit. Allergies   Allergen Reactions    Other Plant, Animal, Environmental Cough, Sneezing and Other (comments)     Seasonal allergies - sneezing, cough, itchy eyes            Review of Systems:  Constitutional: negative  Eyes: negative  Ears, Nose, Mouth, Throat, and Face: negative  Respiratory: negative  Cardiovascular: negative  Gastrointestinal: negative  Genitourinary:negative  Integument/Breast: positive for Soft tissue mass on the right shoulder/back  Hematologic/Lymphatic: negative  Musculoskeletal:negative  Neurological: negative  Behavioral/Psychiatric: negative  Endocrine: negative  Allergic/Immunologic: negative    Objective:     Visit Vitals  /62 (BP 1 Location: Left arm, BP Patient Position: Sitting, BP Cuff Size: Small adult)   Pulse 72   Temp 98.8 °F (37.1 °C)   Wt 61.7 kg (136 lb)   LMP 04/27/2021 (Approximate)   SpO2 97%   BMI 24.87 kg/m²       Physical Exam:      General:  in no apparent distress, alert, oriented times 3 and cooperative   Eyes:  conjunctivae and sclerae normal, pupils equal, round, reactive to light   Throat & Neck: no erythema or exudates noted and neck supple and symmetrical; no palpable masses   Lungs:   clear to auscultation bilaterally   Heart:  Regular rate and rhythm   Abdomen:   flat, soft, nontender, nondistended, no masses or organomegaly   Extremities: extremities normal, atraumatic, no cyanosis or edema   Right shoulder/back: There is a large soft tissue mass, about 9 x 5 cm located on the right upper back on the posterior shoulder area. It is easily movable with no overlying skin changes. There is no erythema and the area is nontender.        Imaging and Lab Review:     CBC:   Lab Results   Component Value Date/Time    WBC 7.0 05/10/2021 12:00 AM    RBC 4.67 05/10/2021 12:00 AM    HGB 12.6 05/10/2021 12:00 AM    HCT 40.6 05/10/2021 12:00 AM    PLATELET 547 12/95/0712 12:00 AM     BMP:   Lab Results   Component Value Date/Time    Glucose 114 (H) 08/13/2018 04:06 PM    Sodium 140 08/13/2018 04:06 PM    Potassium 3.7 08/13/2018 04:06 PM    Chloride 108 08/13/2018 04:06 PM    CO2 25 08/13/2018 04:06 PM    BUN 12 08/13/2018 04:06 PM    Creatinine 0.67 08/13/2018 04:06 PM    Calcium 8.6 08/13/2018 04:06 PM     CMP:  Lab Results   Component Value Date/Time    Glucose 114 (H) 08/13/2018 04:06 PM    Sodium 140 08/13/2018 04:06 PM    Potassium 3.7 08/13/2018 04:06 PM    Chloride 108 08/13/2018 04:06 PM    CO2 25 08/13/2018 04:06 PM    BUN 12 08/13/2018 04:06 PM    Creatinine 0.67 08/13/2018 04:06 PM    Calcium 8.6 08/13/2018 04:06 PM    Anion gap 7 08/13/2018 04:06 PM    BUN/Creatinine ratio 18 08/13/2018 04:06 PM    Alk. phosphatase 19 (L) 08/13/2018 04:06 PM    Protein, total 7.1 08/13/2018 04:06 PM    Albumin 3.5 08/13/2018 04:06 PM    Globulin 3.6 08/13/2018 04:06 PM    A-G Ratio 1.0 08/13/2018 04:06 PM       No results found for this or any previous visit (from the past 24 hour(s)). images and reports reviewed    Assessment:   Prisca Kearney is a 31 y.o. female who is presenting with a right upper back/posterior shoulder mass that has been present for years. Based on the examination it consist of a very large lipoma and the patient would like it to be excised now. I explained to the patient about the surgery and I agree with the surgical excision. I explained to her that the incision would be large because the mass is about 8 to 9 cm.      Plan:     Schedule the patient for excision of a right upper back/shoulder soft tissue mass    Please call me if you have any questions (cell phone: [phone])     Signed By: Kellie Oconnor MD     May 24, 2021

## 2021-05-24 NOTE — PROGRESS NOTES
Roscoe Davenport is a 31 y.o. female (: 1990) presenting to address:    Chief Complaint   Patient presents with    New Patient     Lipoma on back right shoulder/ last seen 18 Dr Sherald Nyhan       Medication list and allergies have been reviewed with Roscoetian Villedashanon and updated as of today's date. I have gone over all Medical, Surgical and Social History with Antonia Anthony and updated/added the information accordingly.

## 2021-05-27 ENCOUNTER — TELEPHONE (OUTPATIENT)
Dept: SURGERY | Age: 31
End: 2021-05-27

## 2021-06-01 ENCOUNTER — TELEPHONE (OUTPATIENT)
Dept: SURGERY | Age: 31
End: 2021-06-01

## 2021-06-10 ENCOUNTER — OFFICE VISIT (OUTPATIENT)
Dept: FAMILY MEDICINE CLINIC | Age: 31
End: 2021-06-10
Payer: OTHER GOVERNMENT

## 2021-06-10 VITALS
RESPIRATION RATE: 16 BRPM | HEART RATE: 80 BPM | BODY MASS INDEX: 24.44 KG/M2 | SYSTOLIC BLOOD PRESSURE: 102 MMHG | OXYGEN SATURATION: 99 % | WEIGHT: 132.8 LBS | DIASTOLIC BLOOD PRESSURE: 58 MMHG | TEMPERATURE: 98.6 F | HEIGHT: 62 IN

## 2021-06-10 DIAGNOSIS — K58.0 IRRITABLE BOWEL SYNDROME WITH DIARRHEA: ICD-10-CM

## 2021-06-10 DIAGNOSIS — F43.22 ADJUSTMENT DISORDER WITH ANXIETY: Primary | ICD-10-CM

## 2021-06-10 PROBLEM — O22.40 HEMORRHOIDS IN PREGNANCY: Status: RESOLVED | Noted: 2019-07-04 | Resolved: 2021-06-10

## 2021-06-10 PROBLEM — O99.013 ANEMIA AFFECTING PREGNANCY IN THIRD TRIMESTER: Status: RESOLVED | Noted: 2019-07-04 | Resolved: 2021-06-10

## 2021-06-10 PROCEDURE — 99213 OFFICE O/P EST LOW 20 MIN: CPT | Performed by: FAMILY MEDICINE

## 2021-06-10 NOTE — PROGRESS NOTES
Sandralee Mcburney is a 31 y.o.  female and presents with    Chief Complaint   Patient presents with    Results    Anxiety     pt did not recieve medication prescribed from last visit           Subjective: Anxiety  Patient complains of evaluation of anxiety disorder, increased stress. She has the following anxiety symptoms: palpitations, insomnia, racing thoughts, psychomotor agitation, feelings of losing control. She feels like she cannot stop worrying; Onset of symptoms was approximately 2 months ago, gradually worsening since that time. She denies current suicidal and homicidal ideation. Family history significant for anxiety. Possible organic causes contributing are: none. Risk factors: negative life event stress at work Previous treatment includes nothing and no other therapies. She complains of the following side effects from the treatment: none. She has a one year old and a full time job. She has scheduled an appointment with a counselor. She reports that her stomach pain has improved. ROS   General ROS: negative for - chills, fatigue or fever  Psychological ROS: negative for - anxiety or depression  Ophthalmic ROS: positive for - uses glasses  ENT ROS: negative for - headaches, nasal congestion or sore throat  Endocrine ROS: negative for - polydipsia/polyuria, skin changes or temperature intolerance  Respiratory ROS: no cough, shortness of breath, or wheezing  Cardiovascular ROS: no chest pain or dyspnea on exertion  Genito-Urinary ROS: no dysuria, trouble voiding, or hematuria  Musculoskeletal ROS: negative for - joint pain or muscle pain  Neurological ROS: no TIA or stroke symptoms  Dermatological ROS: negative for - rash or skin lesion changes     All other systems reviewed and are negative.     Objective:  Vitals:    06/10/21 1413 06/10/21 1420   BP: (!) 101/58 (!) 102/58   Pulse: 80    Resp: 16    Temp: 98.6 °F (37 °C)    TempSrc: Temporal    SpO2: 99%    Weight: 132 lb 12.8 oz (60.2 kg)    Height: 5' 2\" (1.575 m)    PainSc:   0 - No pain        alert, well appearing, and in no distress, oriented to person, place, and time and normal appearing weight  Mental status - anxious  Heart - normal rate, regular rhythm, normal S1, S2, no murmurs, rubs, clicks or gallops  Neurological - cranial nerves II through XII intact    LABS     TESTS      Assessment/Plan:    1. Adjustment disorder with anxiety  Relaxation techniques and counseling as scheduled    2. Irritable bowel syndrome with diarrhea  Improved; amitiza not continued    Lab review: labs reviewed, I note that hemogram normal, TSH normal      I have discussed the diagnosis with the patient and the intended plan as seen in the above orders. The patient has received an after-visit summary and questions were answered concerning future plans. I have discussed medication side effects and warnings with the patient as well. I have reviewed the plan of care with the patient, accepted their input and they are in agreement with the treatment goals.

## 2021-06-10 NOTE — PROGRESS NOTES
Depression Screening:  3 most recent PHQ Screens 6/10/2021   Little interest or pleasure in doing things Not at all   Feeling down, depressed, irritable, or hopeless Not at all   Total Score PHQ 2 0       Learning Assessment:  Learning Assessment 8/13/2018   PRIMARY LEARNER Patient   HIGHEST LEVEL OF EDUCATION - PRIMARY LEARNER  4 YEARS OF COLLEGE   BARRIERS PRIMARY LEARNER NONE   CO-LEARNER CAREGIVER No   PRIMARY LANGUAGE ENGLISH   LEARNER PREFERENCE PRIMARY LISTENING   ANSWERED BY patient   RELATIONSHIP SELF       Abuse Screening:  Abuse Screening Questionnaire 8/13/2018   Do you ever feel afraid of your partner? N   Are you in a relationship with someone who physically or mentally threatens you? N   Is it safe for you to go home? Y       Fall Risk  No flowsheet data found. ADL  No flowsheet data found. Travel Screening:    Travel Screening     Question   Response    In the last month, have you been in contact with someone who was confirmed or suspected to have Coronavirus / COVID-19? No / Unsure    Have you had a COVID-19 viral test in the last 14 days? No    Do you have any of the following new or worsening symptoms? None of these    Have you traveled internationally or domestically in the last month? No      Travel History   Travel since 05/10/21     No documented travel since 05/10/21          Health Maintenance reviewed and discussed and ordered per Provider. Health Maintenance Due   Topic Date Due    COVID-19 Vaccine (1) Never done   . Narinder Lovell presents today for   Chief Complaint   Patient presents with    Results    Other     pt did not recieve medication prescribed from last visit       Is someone accompanying this pt? no    Is the patient using any DME equipment during OV? no    Coordination of Care:  1. Have you been to the ER, urgent care clinic since your last visit? Hospitalized since your last visit? no    2.  Have you seen or consulted any other health care providers outside of the [address] [place] [address] since your last visit? Include any pap smears or colon screening.  no

## 2021-06-11 ENCOUNTER — HOSPITAL ENCOUNTER (OUTPATIENT)
Dept: LAB | Age: 31
Discharge: HOME | End: 2021-06-11
Payer: OTHER GOVERNMENT

## 2021-06-11 PROCEDURE — U0005 INFEC AGEN DETEC AMPLI PROBE: HCPCS

## 2021-06-11 PROCEDURE — 36415 COLL VENOUS BLD VENIPUNCTURE: CPT

## 2021-06-12 LAB — SARS-COV-2, COV2NT: NOT DETECTED

## 2021-06-14 RX ORDER — NORETHINDRONE ACETATE AND ETHINYL ESTRADIOL AND FERROUS FUMARATE 1.5-30(21)
1 KIT ORAL DAILY
COMMUNITY
Start: 2021-04-06

## 2021-06-14 NOTE — PERIOP NOTES
PRE-SURGICAL INSTRUCTIONS        Patient's Name:  Eric Alarcon      Today's Date:  6/14/2021              Surgery Date:  6/17/2021                1. Do NOT eat or drink anything, including candy, gum, or ice chips after midnight on 06/16, unless you have specific instructions from your surgeon or anesthesia provider to do so.  2. You may brush your teeth before coming to the hospital.  3. No smoking 24 hours prior to the day of surgery. 4. No alcohol 24 hours prior to the day of surgery. 5. No recreational drugs for one week prior to the day of surgery. 6. Leave all valuables, including money/purse, at home. 7. Remove all jewelry, nail polish, acrylic nails, and makeup (including mascara); no lotions powders, deodorant, or perfume/cologne/after shave on the skin. 8. Glasses/contact lenses and dentures may be worn to the hospital.  They will be removed prior to surgery. 9. Call your doctor if symptoms of a cold or illness develop within 24-48 hours prior to your surgery. 10.  AN ADULT MUST DRIVE YOU HOME AFTER OUTPATIENT SURGERY. 11.  If you are having an outpatient procedure, please make arrangements for a responsible adult to be with you for 24 hours after your surgery. 12.  NO VISITORS in the hospital at this time for outpatient procedures. Exceptions may be made for surgical admissions, per nursing unit guidelines      Special Instructions: Take these medications the morning of surgery with a sip of water:  NONE    On  the day of surgery, come in the main entrance of [facility]. Let the  at the desk know you are there for surgery. A staff member will come escort you to the surgical area on the second floor.     If you have any questions or concerns, please do not hesitate to call:     (Prior to the day of surgery) PAT department:  [phone]   (Day of surgery) Pre-Op department:  [phone]    These surgical instructions were reviewed with Rhina during the PAT phone call.

## 2021-06-15 ENCOUNTER — ANESTHESIA EVENT (OUTPATIENT)
Dept: SURGERY | Age: 31
End: 2021-06-15
Payer: OTHER GOVERNMENT

## 2021-06-17 ENCOUNTER — ANESTHESIA (OUTPATIENT)
Dept: SURGERY | Age: 31
End: 2021-06-17
Payer: OTHER GOVERNMENT

## 2021-06-17 ENCOUNTER — HOSPITAL ENCOUNTER (OUTPATIENT)
Age: 31
Setting detail: OUTPATIENT SURGERY
Discharge: HOME | End: 2021-06-17
Attending: SURGERY | Admitting: SURGERY
Payer: OTHER GOVERNMENT

## 2021-06-17 VITALS
WEIGHT: 132.6 LBS | SYSTOLIC BLOOD PRESSURE: 96 MMHG | OXYGEN SATURATION: 100 % | RESPIRATION RATE: 14 BRPM | TEMPERATURE: 97.8 F | HEIGHT: 61 IN | HEART RATE: 71 BPM | BODY MASS INDEX: 25.04 KG/M2 | DIASTOLIC BLOOD PRESSURE: 63 MMHG

## 2021-06-17 DIAGNOSIS — R22.2 MASS ON BACK: Primary | ICD-10-CM

## 2021-06-17 LAB — HCG UR QL: NEGATIVE

## 2021-06-17 PROCEDURE — 88304 TISSUE EXAM BY PATHOLOGIST: CPT

## 2021-06-17 PROCEDURE — 21931 EXC BACK LES SC 3 CM/>: CPT | Performed by: SURGERY

## 2021-06-17 PROCEDURE — 77030040361 HC SLV COMPR DVT MDII -B: Performed by: SURGERY

## 2021-06-17 PROCEDURE — 76210000016 HC OR PH I REC 1 TO 1.5 HR: Performed by: SURGERY

## 2021-06-17 PROCEDURE — 74011250636 HC RX REV CODE- 250/636: Performed by: NURSE ANESTHETIST, CERTIFIED REGISTERED

## 2021-06-17 PROCEDURE — 81025 URINE PREGNANCY TEST: CPT

## 2021-06-17 PROCEDURE — 77030002933 HC SUT MCRYL J&J -A: Performed by: SURGERY

## 2021-06-17 PROCEDURE — 2709999900 HC NON-CHARGEABLE SUPPLY: Performed by: SURGERY

## 2021-06-17 PROCEDURE — 77030008683 HC TU ET CUF COVD -A: Performed by: ANESTHESIOLOGY

## 2021-06-17 PROCEDURE — 76210000020 HC REC RM PH II FIRST 0.5 HR: Performed by: SURGERY

## 2021-06-17 PROCEDURE — 77030031139 HC SUT VCRL2 J&J -A: Performed by: SURGERY

## 2021-06-17 PROCEDURE — 77030040922 HC BLNKT HYPOTHRM STRY -A: Performed by: SURGERY

## 2021-06-17 PROCEDURE — 77030010507 HC ADH SKN DERMBND J&J -B: Performed by: SURGERY

## 2021-06-17 PROCEDURE — 76060000032 HC ANESTHESIA 0.5 TO 1 HR: Performed by: SURGERY

## 2021-06-17 PROCEDURE — 74011250637 HC RX REV CODE- 250/637: Performed by: NURSE ANESTHETIST, CERTIFIED REGISTERED

## 2021-06-17 PROCEDURE — 77030039266 HC ADH SKN EXOFIN S2SG -A: Performed by: SURGERY

## 2021-06-17 PROCEDURE — 00300 ANES ALL PX INTEG H/N/PTRUNK: CPT | Performed by: ANESTHESIOLOGY

## 2021-06-17 PROCEDURE — 77030011267 HC ELECTRD BLD COVD -A: Performed by: SURGERY

## 2021-06-17 PROCEDURE — 74011000250 HC RX REV CODE- 250: Performed by: NURSE ANESTHETIST, CERTIFIED REGISTERED

## 2021-06-17 PROCEDURE — 76010000138 HC OR TIME 0.5 TO 1 HR: Performed by: SURGERY

## 2021-06-17 PROCEDURE — 00300 ANES ALL PX INTEG H/N/PTRUNK: CPT | Performed by: NURSE ANESTHETIST, CERTIFIED REGISTERED

## 2021-06-17 PROCEDURE — 77030003028 HC SUT VCRL J&J -A: Performed by: SURGERY

## 2021-06-17 RX ORDER — DEXAMETHASONE SODIUM PHOSPHATE 4 MG/ML
INJECTION, SOLUTION INTRA-ARTICULAR; INTRALESIONAL; INTRAMUSCULAR; INTRAVENOUS; SOFT TISSUE AS NEEDED
Status: DISCONTINUED | OUTPATIENT
Start: 2021-06-17 | End: 2021-06-17 | Stop reason: HOSPADM

## 2021-06-17 RX ORDER — HYDROMORPHONE HYDROCHLORIDE 2 MG/ML
0.5 INJECTION, SOLUTION INTRAMUSCULAR; INTRAVENOUS; SUBCUTANEOUS AS NEEDED
Status: DISCONTINUED | OUTPATIENT
Start: 2021-06-17 | End: 2021-06-17 | Stop reason: HOSPADM

## 2021-06-17 RX ORDER — DIPHENHYDRAMINE HYDROCHLORIDE 50 MG/ML
12.5 INJECTION, SOLUTION INTRAMUSCULAR; INTRAVENOUS
Status: DISCONTINUED | OUTPATIENT
Start: 2021-06-17 | End: 2021-06-17 | Stop reason: HOSPADM

## 2021-06-17 RX ORDER — OXYCODONE AND ACETAMINOPHEN 5; 325 MG/1; MG/1
1 TABLET ORAL
Qty: 12 TABLET | Refills: 0 | Status: SHIPPED | OUTPATIENT
Start: 2021-06-17 | End: 2021-06-20

## 2021-06-17 RX ORDER — MIDAZOLAM HYDROCHLORIDE 1 MG/ML
INJECTION, SOLUTION INTRAMUSCULAR; INTRAVENOUS AS NEEDED
Status: DISCONTINUED | OUTPATIENT
Start: 2021-06-17 | End: 2021-06-17 | Stop reason: HOSPADM

## 2021-06-17 RX ORDER — SODIUM CHLORIDE, SODIUM LACTATE, POTASSIUM CHLORIDE, CALCIUM CHLORIDE 600; 310; 30; 20 MG/100ML; MG/100ML; MG/100ML; MG/100ML
75 INJECTION, SOLUTION INTRAVENOUS CONTINUOUS
Status: DISCONTINUED | OUTPATIENT
Start: 2021-06-17 | End: 2021-06-17 | Stop reason: HOSPADM

## 2021-06-17 RX ORDER — SUCCINYLCHOLINE CHLORIDE 20 MG/ML
INJECTION INTRAMUSCULAR; INTRAVENOUS AS NEEDED
Status: DISCONTINUED | OUTPATIENT
Start: 2021-06-17 | End: 2021-06-17 | Stop reason: HOSPADM

## 2021-06-17 RX ORDER — ONDANSETRON 2 MG/ML
4 INJECTION INTRAMUSCULAR; INTRAVENOUS ONCE
Status: COMPLETED | OUTPATIENT
Start: 2021-06-17 | End: 2021-06-17

## 2021-06-17 RX ORDER — LIDOCAINE HYDROCHLORIDE 10 MG/ML
0.1 INJECTION, SOLUTION EPIDURAL; INFILTRATION; INTRACAUDAL; PERINEURAL AS NEEDED
Status: DISCONTINUED | OUTPATIENT
Start: 2021-06-17 | End: 2021-06-17 | Stop reason: HOSPADM

## 2021-06-17 RX ORDER — CEFAZOLIN SODIUM 1 G/3ML
INJECTION, POWDER, FOR SOLUTION INTRAMUSCULAR; INTRAVENOUS AS NEEDED
Status: DISCONTINUED | OUTPATIENT
Start: 2021-06-17 | End: 2021-06-17 | Stop reason: HOSPADM

## 2021-06-17 RX ORDER — FAMOTIDINE 20 MG/1
20 TABLET, FILM COATED ORAL ONCE
Status: COMPLETED | OUTPATIENT
Start: 2021-06-17 | End: 2021-06-17

## 2021-06-17 RX ORDER — SODIUM CHLORIDE 0.9 % (FLUSH) 0.9 %
5-40 SYRINGE (ML) INJECTION EVERY 8 HOURS
Status: DISCONTINUED | OUTPATIENT
Start: 2021-06-17 | End: 2021-06-17 | Stop reason: HOSPADM

## 2021-06-17 RX ORDER — SODIUM CHLORIDE 0.9 % (FLUSH) 0.9 %
5-40 SYRINGE (ML) INJECTION AS NEEDED
Status: DISCONTINUED | OUTPATIENT
Start: 2021-06-17 | End: 2021-06-17 | Stop reason: HOSPADM

## 2021-06-17 RX ORDER — FENTANYL CITRATE 50 UG/ML
INJECTION, SOLUTION INTRAMUSCULAR; INTRAVENOUS AS NEEDED
Status: DISCONTINUED | OUTPATIENT
Start: 2021-06-17 | End: 2021-06-17 | Stop reason: HOSPADM

## 2021-06-17 RX ORDER — ROCURONIUM BROMIDE 10 MG/ML
INJECTION, SOLUTION INTRAVENOUS AS NEEDED
Status: DISCONTINUED | OUTPATIENT
Start: 2021-06-17 | End: 2021-06-17 | Stop reason: HOSPADM

## 2021-06-17 RX ORDER — PROPOFOL 10 MG/ML
INJECTION, EMULSION INTRAVENOUS AS NEEDED
Status: DISCONTINUED | OUTPATIENT
Start: 2021-06-17 | End: 2021-06-17 | Stop reason: HOSPADM

## 2021-06-17 RX ORDER — LIDOCAINE HYDROCHLORIDE 20 MG/ML
INJECTION, SOLUTION EPIDURAL; INFILTRATION; INTRACAUDAL; PERINEURAL AS NEEDED
Status: DISCONTINUED | OUTPATIENT
Start: 2021-06-17 | End: 2021-06-17 | Stop reason: HOSPADM

## 2021-06-17 RX ORDER — ONDANSETRON 2 MG/ML
INJECTION INTRAMUSCULAR; INTRAVENOUS AS NEEDED
Status: DISCONTINUED | OUTPATIENT
Start: 2021-06-17 | End: 2021-06-17 | Stop reason: HOSPADM

## 2021-06-17 RX ORDER — NALBUPHINE HYDROCHLORIDE 10 MG/ML
5 INJECTION, SOLUTION INTRAMUSCULAR; INTRAVENOUS; SUBCUTANEOUS
Status: DISCONTINUED | OUTPATIENT
Start: 2021-06-17 | End: 2021-06-17 | Stop reason: HOSPADM

## 2021-06-17 RX ADMIN — LIDOCAINE HYDROCHLORIDE 20 MG: 20 INJECTION, SOLUTION EPIDURAL; INFILTRATION; INTRACAUDAL; PERINEURAL at 10:07

## 2021-06-17 RX ADMIN — FENTANYL CITRATE 100 MCG: 50 INJECTION, SOLUTION INTRAMUSCULAR; INTRAVENOUS at 10:07

## 2021-06-17 RX ADMIN — PROPOFOL 200 MG: 10 INJECTION, EMULSION INTRAVENOUS at 10:07

## 2021-06-17 RX ADMIN — FAMOTIDINE 20 MG: 20 TABLET ORAL at 09:06

## 2021-06-17 RX ADMIN — HYDROMORPHONE HYDROCHLORIDE 0.5 MG: 2 INJECTION, SOLUTION INTRAMUSCULAR; INTRAVENOUS; SUBCUTANEOUS at 11:23

## 2021-06-17 RX ADMIN — ONDANSETRON 4 MG: 2 INJECTION INTRAMUSCULAR; INTRAVENOUS at 10:07

## 2021-06-17 RX ADMIN — SODIUM CHLORIDE, SODIUM LACTATE, POTASSIUM CHLORIDE, AND CALCIUM CHLORIDE 75 ML/HR: 600; 310; 30; 20 INJECTION, SOLUTION INTRAVENOUS at 09:06

## 2021-06-17 RX ADMIN — ONDANSETRON 4 MG: 2 INJECTION INTRAMUSCULAR; INTRAVENOUS at 11:35

## 2021-06-17 RX ADMIN — MIDAZOLAM HYDROCHLORIDE 2 MG: 2 INJECTION, SOLUTION INTRAMUSCULAR; INTRAVENOUS at 10:02

## 2021-06-17 RX ADMIN — ROCURONIUM BROMIDE 10 MG: 50 INJECTION INTRAVENOUS at 10:07

## 2021-06-17 RX ADMIN — DEXAMETHASONE SODIUM PHOSPHATE 4 MG: 4 INJECTION, SOLUTION INTRAMUSCULAR; INTRAVENOUS at 10:07

## 2021-06-17 RX ADMIN — CEFAZOLIN 2 G: 1 INJECTION, POWDER, FOR SOLUTION INTRAMUSCULAR; INTRAVENOUS at 10:02

## 2021-06-17 RX ADMIN — SUCCINYLCHOLINE CHLORIDE 100 MG: 20 INJECTION, SOLUTION INTRAMUSCULAR; INTRAVENOUS at 10:07

## 2021-06-17 NOTE — PROGRESS NOTES
Date of Surgery Update:  Mindy Mantilla was seen and examined. History and physical has been reviewed. The patient has been examined. There have been no significant clinical changes since the completion of the originally dated History and Physical. Will proceed with excision of a right upper back/shoulder soft tissue mass.     Signed By: Colletta Gloss, MD     June 17, 2021 8:29 AM

## 2021-06-17 NOTE — ANESTHESIA POSTPROCEDURE EVALUATION
Procedure(s):  EXCISION OF A RIGHT UPPER BACK/SHOULDER SOFT TISSUE MASS.     general    Anesthesia Post Evaluation      Multimodal analgesia: multimodal analgesia used between 6 hours prior to anesthesia start to PACU discharge  Patient location during evaluation: bedside  Patient participation: complete - patient participated  Level of consciousness: awake  Pain score: 0  Pain management: adequate  Airway patency: patent  Anesthetic complications: no  Cardiovascular status: stable  Respiratory status: acceptable  Hydration status: acceptable  Post anesthesia nausea and vomiting:  controlled  Final Post Anesthesia Temperature Assessment:  Normothermia (36.0-37.5 degrees C)      INITIAL Post-op Vital signs:   Vitals Value Taken Time   BP 96/63 06/17/21 1216   Temp 36.6 °C (97.8 °F) 06/17/21 1216   Pulse 71 06/17/21 1216   Resp 14 06/17/21 1216   SpO2 100 % 06/17/21 1216

## 2021-06-17 NOTE — BRIEF OP NOTE
Brief Postoperative Note    Patient: Daren Rutherford  YOB: 1990  MRN: [id number]    Date of Procedure: 6/17/2021     Pre-Op Diagnosis: R22.2 SOFT TISSUE MASS/MASS ON BACK    Post-Op Diagnosis: Same as preoperative diagnosis. Procedure(s):  EXCISION OF A RIGHT UPPER BACK/SHOULDER SOFT TISSUE MASS    Surgeon(s):  Leopold Pita, MD    Surgical Assistant: Surg Asst-1: Conley Holstein    Anesthesia: General     Estimated Blood Loss (mL): Minimal    Complications: None    Specimens:   ID Type Source Tests Collected by Time Destination   1 : back mass Preservative Mass  Leopold Pita, MD 6/17/2021 1018 Pathology        Implants: * No implants in log *    Drains: * No LDAs found *    Findings: Large lipoma.      Electronically Signed by Stella Myers MD on 6/17/2021 at 10:22 AM

## 2021-06-17 NOTE — ANESTHESIA PREPROCEDURE EVALUATION
Relevant Problems   No relevant active problems       Anesthetic History   No history of anesthetic complications            Review of Systems / Medical History  Patient summary reviewed and pertinent labs reviewed    Pulmonary  Within defined limits                 Neuro/Psych   Within defined limits           Cardiovascular                  Exercise tolerance: >4 METS     GI/Hepatic/Renal  Within defined limits              Endo/Other  Within defined limits           Other Findings              Physical Exam    Airway  Mallampati: II  TM Distance: 4 - 6 cm  Neck ROM: normal range of motion   Mouth opening: Normal     Cardiovascular  Regular rate and rhythm,  S1 and S2 normal,  no murmur, click, rub, or gallop             Dental  No notable dental hx       Pulmonary  Breath sounds clear to auscultation               Abdominal  GI exam deferred       Other Findings            Anesthetic Plan    ASA: 1  Anesthesia type: general          Induction: Intravenous  Anesthetic plan and risks discussed with: Patient

## 2021-06-17 NOTE — DISCHARGE INSTRUCTIONS
Discharge Instructions Following Surgery    Patient: Pravin Bhandari MRN: [id number]  SSN: [id number]    YOB: 1990  Age: 31 y.o. Sex: female      Activity  · As tolerated, walking encourage, stairs are okay. · Avoid strenuous activities - no lifting anything heavier than 15 pounds till seen in the clinic. · You may shower at home after 24 hours. Diet  · Regular diet after nausea from the anesthetic has passed. Pain  · Take pain medication as directed by your doctor. · Call your doctor if pain is NOT relieved by medication. Wound and Dressing Care  · There is glue on the wounds. No need for any dressing care. · Apply ice packs to the area of the surgery for the first 1 to 2 days  · Apply warm compresses after 2 days for pain relieve if needed    After Anesthesia  · For the first 24 hours: DO NOT Drive, Drink alcoholic beverages, or Make important decisions. · Be aware of dizziness following anesthesia and while taking pain medication. Call your doctor if  · Excessive bleeding that does not stop after holding mild pressure over the area. · Temperature of 101 degrees F or above. · Redness,excessive swelling or bruising, and/or green or yellow, smelly discharge from incision. · If nausea and vomiting continues. Appointment date/time Follow-Up Phone Calls    · Call the office at [phone] to make your follow-up appointment in 2 weeks after the surgery (if not already set up) . Dr. Breanne Garcia cell phone number is [phone]. Please call me if you have any concerns or questions.        DISCHARGE SUMMARY from Nurse    PATIENT INSTRUCTIONS:    After general anesthesia or intravenous sedation, for 24 hours or while taking prescription Narcotics:  · Limit your activities  · Do not drive and operate hazardous machinery  · Do not make important personal or business decisions  · Do  not drink alcoholic beverages  · If you have not urinated within 8 hours after discharge, please contact your surgeon on call. Report the following to your surgeon:  · Excessive pain, swelling, redness or odor of or around the surgical area  · Temperature over 100.5  · Nausea and vomiting lasting longer than 4 hours or if unable to take medications  · Any signs of decreased circulation or nerve impairment to extremity: change in color, persistent  numbness, tingling, coldness or increase pain  · Any questions    What to do at Home:  Recommended activity: Activity as tolerated,   . *  Please update this list whenever your medications are discontinued, doses are      changed, or new medications (including over-the-counter products) are added. *  Please carry medication information at all times in case of emergency situations. These are general instructions for a healthy lifestyle:    No smoking/ No tobacco products/ Avoid exposure to second hand smoke  Surgeon General's Warning:  Quitting smoking now greatly reduces serious risk to your health. Obesity, smoking, and sedentary lifestyle greatly increases your risk for illness    A healthy diet, regular physical exercise & weight monitoring are important for maintaining a healthy lifestyle    You may be retaining fluid if you have a history of heart failure or if you experience any of the following symptoms:  Weight gain of 3 pounds or more overnight or 5 pounds in a week, increased swelling in our hands or feet or shortness of breath while lying flat in bed. Please call your doctor as soon as you notice any of these symptoms; do not wait until your next office visit    The discharge information has been reviewed with the patient. The patient verbalized understanding. Discharge medications reviewed with the patient and appropriate educational materials and side effects teaching were provided.       Patient armband removed and shredded  ___________________________________________________________________________________________________________________________________

## 2021-06-18 NOTE — OP NOTES
[address] [place] [address]   OPERATIVE REPORT    Name:  Diamond Sargent  MR#:   [id number]  :  1990  ACCOUNT #:  [de-identified]  DATE OF SERVICE:  2021    PREOPERATIVE DIAGNOSIS:  Right upper back mass. POSTOPERATIVE DIAGNOSIS:  Right upper back mass. PROCEDURE PERFORMED:  Excision of a large right upper back mass. Size was about 15 x 10 cm. Depth was all the way to below the fascia. SURGEON:  Ever Scales. Cher Francois MD.    Jt Rogers. ANESTHESIA:  General.    COMPLICATIONS:  None. SPECIMENS REMOVED:  Mass. IMPLANTS:  None. ESTIMATED BLOOD LOSS:  Minimal.    DESCRIPTION OF PROCEDURE:  The patient was brought to the operating room. Anesthesia was induced. The patient was placed in prone position. Scrubbing and draping of the upper back were done in the usual manner. A time-out was performed. The mass was identified. It was placed in kind of a transverse way, so I made a transverse incision about 10-11 cm in length and deepened through the skin and subcutaneous tissue. The fascia was opened and slight flap was raised on both sides because the mass was large. It was squeezed out and it was dissected completely from the underlying fascia as well and the muscle. At this point, the mass was completely excised and sent for pathology. Hemostasis was secured with electrocautery. Then, the fascia was closed with interrupted 3-0 Vicryl sutures. The subcutaneous tissue was approximated with 3-0 Vicryl suture and the skin was approximated with 4-0 Monocryl and glue.       Dylan Nguyễn MD      [id number]  D:  2021 10:25  T:  2021 21:31  JOB #:  [id number]

## 2021-06-25 ENCOUNTER — TELEPHONE (OUTPATIENT)
Dept: SURGERY | Age: 31
End: 2021-06-25

## 2021-06-25 NOTE — TELEPHONE ENCOUNTER
Left voicemail message for Lindsey Matta to inquire about status of STD forms for Ms. Anthony and to provide our fax number [phone] only have received Rose 39 form patient dropped off on June 16, 2021. Tony Ríos  states their records indicate the claim was approved and they spoke to a Santy at our office. I explained we don't have a Santy in our office. Will await fax from .

## 2021-06-28 ENCOUNTER — OFFICE VISIT (OUTPATIENT)
Dept: SURGERY | Age: 31
End: 2021-06-28
Payer: OTHER GOVERNMENT

## 2021-06-28 VITALS — TEMPERATURE: 98.3 F | WEIGHT: 130.6 LBS | BODY MASS INDEX: 24.68 KG/M2

## 2021-06-28 DIAGNOSIS — Z09 POSTOPERATIVE EXAMINATION: Primary | ICD-10-CM

## 2021-06-28 PROCEDURE — 99024 POSTOP FOLLOW-UP VISIT: CPT | Performed by: SURGERY

## 2021-06-28 NOTE — PROGRESS NOTES
Pravin Bhandari is a 31 y.o. female (: 1990) presenting to address:    Chief Complaint   Patient presents with    Surgical Follow-up     Excision of large right upper back mass done 21       Medication list and allergies have been reviewed with Pravin Bhandari and updated as of today's date. I have gone over all Medical, Surgical and Social History with Delbert Anthony and updated/added the information accordingly. 1. Have you been to the ER, Urgent Care or Hospitalized since your last visit? NO      2. Have you followed up with your PCP or any other Physicians since your procedure/ last office visit?    NO

## 2021-06-28 NOTE — PROGRESS NOTES
Patient seen and examined. At the beginning she stated that she is having significant pain at the site of the wound as well as numbness and tingling and weakness in her overall right upper extremity. She stated that her  had a left shoulder surgery and he has been having difficulties in his left upper extremity and she feels that she has the same symptoms. Though later when I spoke to her it seems that her pain is localized at the wound and her feeling of weakness and numbness happen only after the surgery and it did get better afterward. Also the patient stated that that she cannot go back to work because of the pain she is having and she would like to skip work for another week or so and she asked to talk to [place] about getting her a letter for that. On examination the patient's wound is healing very well. There is no tenderness and no induration or any drainage. Her right upper extremities examination is normal with good motor power. Her pathology showed a large lipoma of about 8 cm. I told her to observe the situation for now and if she does not get completely better then she can follow-up with me so I can send her for orthopedic surgeon but I told her that the mass was not deep enough to cause any nerve injury. I also told her to exercise and do movement of her right upper extremity to prevent stiffness.

## 2021-07-09 ENCOUNTER — TELEPHONE (OUTPATIENT)
Dept: SURGERY | Age: 31
End: 2021-07-09

## 2021-07-09 NOTE — TELEPHONE ENCOUNTER
Spoke to MsAimee PalaciosRaj regarding request for return to work letter, stating she returned to work on July 6, 2021.

## 2022-11-10 ENCOUNTER — OFFICE VISIT (OUTPATIENT)
Dept: FAMILY MEDICINE CLINIC | Age: 32
End: 2022-11-10
Payer: OTHER GOVERNMENT

## 2022-11-10 VITALS
OXYGEN SATURATION: 100 % | BODY MASS INDEX: 26.06 KG/M2 | HEIGHT: 61 IN | WEIGHT: 138 LBS | RESPIRATION RATE: 16 BRPM | TEMPERATURE: 98.4 F | HEART RATE: 84 BPM | DIASTOLIC BLOOD PRESSURE: 77 MMHG | SYSTOLIC BLOOD PRESSURE: 131 MMHG

## 2022-11-10 DIAGNOSIS — F41.9 ANXIETY: ICD-10-CM

## 2022-11-10 DIAGNOSIS — F33.1 MODERATE RECURRENT MAJOR DEPRESSION (HCC): ICD-10-CM

## 2022-11-10 DIAGNOSIS — Z00.00 ANNUAL PHYSICAL EXAM: Primary | ICD-10-CM

## 2022-11-10 DIAGNOSIS — Z28.21 REFUSED INFLUENZA VACCINE: ICD-10-CM

## 2022-11-10 PROCEDURE — 99395 PREV VISIT EST AGE 18-39: CPT | Performed by: FAMILY MEDICINE

## 2022-11-10 RX ORDER — DULOXETIN HYDROCHLORIDE 20 MG/1
20 CAPSULE, DELAYED RELEASE ORAL DAILY
Qty: 30 CAPSULE | Refills: 2 | Status: SHIPPED | OUTPATIENT
Start: 2022-11-10

## 2022-11-10 NOTE — PROGRESS NOTES
Felice Ludwig is a 34 y.o.  female and presents with    Chief Complaint   Patient presents with    Anxiety    Stress    Depression    Complete Physical           Subjective: Well Adult Physical   Patient here for a comprehensive physical exam.The patient reports problems - irritability with outburst this morning; she has been in therapy for almost a year with identifying coping skills; she has anxiety and depression. She has relaxation techniques with breathing exercises. She is stressed with family and with work. She has never been treated for depression and anxiety in the past.  Her mother has been diagnosed with depression and takes medication. +palpitations, worry, abdominal pain  Do you take any herbs or supplements that were not prescribed by a doctor? no Are you taking calcium supplements? no Are you taking aspirin daily? not applicable    ROS     All other systems reviewed and are negative. Objective:    Visit Vitals  /77 (BP 1 Location: Left upper arm, BP Patient Position: Sitting, BP Cuff Size: Adult)   Pulse 84   Temp 98.4 °F (36.9 °C) (Temporal)   Resp 16   Ht 5' 1\" (1.549 m)   Wt 138 lb (62.6 kg)   SpO2 100%   BMI 26.07 kg/m²       General appearance  alert, cooperative, no distress, appears stated age   Head  Normocephalic, without obvious abnormality, atraumatic   Eyes  conjunctivae/corneas clear. PERRL, EOM's intact. Ears  normal TM's and external ear canals AU   Nose Nares normal. Septum midline. Mucosa normal. No drainage or sinus tenderness. Throat Lips, mucosa, and tongue normal. Teeth and gums normal   Neck supple, symmetrical, trachea midline, no adenopathy, thyroid: not enlarged, symmetric, no tenderness/mass/nodules   Back   symmetric, no curvature.  ROM normal. No CVA tenderness   Lungs   clear to auscultation bilaterally   Breasts  Not examined   Heart  regular rate and rhythm, S1, S2 normal, no murmur, click, rub or gallop   Abdomen   soft, non-tender. Bowel sounds normal. No masses,  No organomegaly   Pelvic Deferred   Extremities extremities normal, atraumatic, no cyanosis or edema   Pulses 2+ and symmetric   Skin Skin color, texture, turgor normal. No rashes or lesions   Lymph nodes Cervical, supraclavicular, and axillary nodes normal.   Neurologic Normal     PHQ9 = 11    LABS     TESTS      Assessment/Plan:    1. Annual physical exam  Reviewed preventive recommendations    2. Moderate recurrent major depression ([facility] [address]  Start duloxetine for mood; encourage healthy lifestyle  - DULoxetine (CYMBALTA) 20 mg capsule; Take 1 Capsule by mouth daily. Dispense: 30 Capsule; Refill: 2    3. Anxiety  Encourage relaxation techniques; continue therapy  - DULoxetine (CYMBALTA) 20 mg capsule; Take 1 Capsule by mouth daily. Dispense: 30 Capsule; Refill: 2        I have discussed the diagnosis with the patient and the intended plan as seen in the above orders. The patient has received an after-visit summary and questions were answered concerning future plans. I have discussed medication side effects and warnings with the patient as well. I have reviewed the plan of care with the patient, accepted their input and they are in agreement with the treatment goals.

## 2022-11-10 NOTE — PROGRESS NOTES
Ramiro Santos presents today for   Chief Complaint   Patient presents with    Anxiety    Stress    Depression       Is someone accompanying this pt? no    Is the patient using any DME equipment during OV? no    Depression Screening:  3 most recent PHQ Screens 11/10/2022   Little interest or pleasure in doing things Several days   Feeling down, depressed, irritable, or hopeless Several days   Total Score PHQ 2 2       Learning Assessment:  Learning Assessment 8/13/2018   PRIMARY LEARNER Patient   HIGHEST LEVEL OF EDUCATION - PRIMARY LEARNER  4 YEARS OF COLLEGE   BARRIERS PRIMARY LEARNER NONE   CO-LEARNER CAREGIVER No   PRIMARY LANGUAGE ENGLISH   LEARNER PREFERENCE PRIMARY LISTENING   ANSWERED BY patient   RELATIONSHIP SELF       Abuse Screening:  Abuse Screening Questionnaire 8/13/2018   Do you ever feel afraid of your partner? N   Are you in a relationship with someone who physically or mentally threatens you? N   Is it safe for you to go home? Y       Fall Risk  No flowsheet data found. Health Maintenance reviewed and discussed and ordered per Provider. Health Maintenance Due   Topic Date Due    COVID-19 Vaccine (1) Never done    Depression Screen  06/10/2022    Flu Vaccine (1) 08/01/2022   .        1. \"Have you been to the ER, urgent care clinic since your last visit? Hospitalized since your last visit? \" No    2. \"Have you seen or consulted any other health care providers outside of the [address] [place] [address] since your last visit? \" No     3. For patients over 45: Has the patient had a colonoscopy? NA - based on age     If the patient is female:    4. For patients over 36: Has the patient had a mammogram? NA - based on age    11. For patients over 21: Has the patient had a pap smear?  Yes - no Care Gap present

## 2022-11-14 ENCOUNTER — TELEPHONE (OUTPATIENT)
Dept: FAMILY MEDICINE CLINIC | Age: 32
End: 2022-11-14

## 2022-11-14 NOTE — TELEPHONE ENCOUNTER
----- Message from Bert Barrow sent at 11/10/2022 10:12 AM EST -----  Subject: Referral Request    Reason for referral request? Patient feels she needs more than a counselor   so that they can diagnosed and treat her she feels like there is more   going on she is having more anxiety and more depression and mood swings   over angry more often   Provider patient wants to be referred to(if known):     Provider Phone Number(if known):     Additional Information for Provider?   ---------------------------------------------------------------------------  --------------  [id number]    [phone]; OK to leave message on voicemail  ---------------------------------------------------------------------------  --------------

## 2022-12-05 ENCOUNTER — OFFICE VISIT (OUTPATIENT)
Dept: FAMILY MEDICINE CLINIC | Age: 32
End: 2022-12-05
Payer: OTHER GOVERNMENT

## 2022-12-05 VITALS
HEIGHT: 61 IN | OXYGEN SATURATION: 100 % | TEMPERATURE: 98.3 F | BODY MASS INDEX: 25.3 KG/M2 | RESPIRATION RATE: 16 BRPM | DIASTOLIC BLOOD PRESSURE: 83 MMHG | WEIGHT: 134 LBS | SYSTOLIC BLOOD PRESSURE: 132 MMHG | HEART RATE: 88 BPM

## 2022-12-05 DIAGNOSIS — J40 BRONCHITIS: ICD-10-CM

## 2022-12-05 DIAGNOSIS — F41.9 ANXIETY: Primary | ICD-10-CM

## 2022-12-05 DIAGNOSIS — F33.1 MODERATE RECURRENT MAJOR DEPRESSION (HCC): ICD-10-CM

## 2022-12-05 PROCEDURE — 99213 OFFICE O/P EST LOW 20 MIN: CPT | Performed by: FAMILY MEDICINE

## 2022-12-05 RX ORDER — ALBUTEROL SULFATE 90 UG/1
AEROSOL, METERED RESPIRATORY (INHALATION)
COMMUNITY
Start: 2022-12-02

## 2022-12-05 NOTE — PROGRESS NOTES
Natalia Garibay presents today for   Chief Complaint   Patient presents with    Depression    Anxiety       Is someone accompanying this pt? no    Is the patient using any DME equipment during OV? no    Depression Screening:  3 most recent PHQ Screens 12/5/2022   Little interest or pleasure in doing things Not at all   Feeling down, depressed, irritable, or hopeless Not at all   Total Score PHQ 2 0   Trouble falling or staying asleep, or sleeping too much -   Feeling tired or having little energy -   Poor appetite, weight loss, or overeating -   Feeling bad about yourself - or that you are a failure or have let yourself or your family down -   Trouble concentrating on things such as school, work, reading, or watching TV -   Moving or speaking so slowly that other people could have noticed; or the opposite being so fidgety that others notice -   Thoughts of being better off dead, or hurting yourself in some way -   PHQ 9 Score -   How difficult have these problems made it for you to do your work, take care of your home and get along with others -       Learning Assessment:  Learning Assessment 8/13/2018   PRIMARY LEARNER Patient   HIGHEST LEVEL OF EDUCATION - PRIMARY LEARNER  4 YEARS OF COLLEGE   BARRIERS PRIMARY LEARNER NONE   CO-LEARNER CAREGIVER No   PRIMARY LANGUAGE ENGLISH   LEARNER PREFERENCE PRIMARY LISTENING   ANSWERED BY patient   RELATIONSHIP SELF       Abuse Screening:  Abuse Screening Questionnaire 8/13/2018   Do you ever feel afraid of your partner? N   Are you in a relationship with someone who physically or mentally threatens you? N   Is it safe for you to go home? Y       Fall Risk  No flowsheet data found. Health Maintenance reviewed and discussed and ordered per Provider. Health Maintenance Due   Topic Date Due    COVID-19 Vaccine (1) Never done   . 1. \"Have you been to the ER, urgent care clinic since your last visit? Hospitalized since your last visit? \" No    2.  \"Have you seen or consulted any other health care providers outside of the [address] [place] [address] since your last visit? \" No     3. For patients over 45: Has the patient had a colonoscopy? NA - based on age     If the patient is female:    4. For patients over 36: Has the patient had a mammogram? NA - based on age    11. For patients over 21: Has the patient had a pap smear?  Yes - no Care Gap present

## 2022-12-05 NOTE — PROGRESS NOTES
*ATTENTION:  This note has been created by a medical student for educational purposes only. Please do not refer to the content of this note for clinical decision-making, billing, or other purposes. Please see attending physicians note to obtain clinical information on this patient. *     Subjective:  Maria Elena Lamb is a 34 y.o. female who presents to the office for medication follow up for treating her depression and anxiety. Patient states that she has been compliant with her medication and attending psychotherapy. She notes initially experiencing worsening of her symptoms the first two weeks of taking her medication, but began experiencing significant improvement by the third week. She reports that therapy is willing to see her once every two weeks and that she will be returning to work next week after being away for [employer]. She reports some anxiety with returning back to work but believes she can handle it. Patient denies SI, HI, a/v hallucinations, headache, syncope, CP, SOB, abdominal pain, dysuria, or hematuria. Assessment/Plan:   Depression & Anxiety - patient states that she is doing well and that her medication is working. Will continue medication as is. Objective:  Patient Vitals for the past 12 hrs:   Temp Pulse Resp BP SpO2   12/05/22 1112 98.3 °F (36.8 °C) 88 16 132/83 100 %        No results found for this or any previous visit (from the past 12 hour(s)). Physical Exam  Vitals and nursing note reviewed. Constitutional:       General: She is not in acute distress. Appearance: Normal appearance. She is normal weight. She is not ill-appearing, toxic-appearing or diaphoretic. HENT:      Head: Normocephalic and atraumatic. Right Ear: External ear normal.      Left Ear: External ear normal.      Nose: Nose normal. No rhinorrhea. Mouth/Throat:      Mouth: Mucous membranes are moist.      Pharynx: Oropharynx is clear.  No oropharyngeal exudate or posterior oropharyngeal erythema. Eyes:      General: No allergic shiner or scleral icterus. Right eye: No discharge. Left eye: No discharge. Extraocular Movements: Extraocular movements intact. Conjunctiva/sclera: Conjunctivae normal.      Pupils: Pupils are equal, round, and reactive to light. Neck:      Thyroid: No thyroid mass. Cardiovascular:      Pulses:           Radial pulses are 2+ on the right side and 2+ on the left side. Pulmonary:      Effort: No tachypnea or bradypnea. Breath sounds: No decreased air movement. No decreased breath sounds. Abdominal:      General: Abdomen is flat. Bowel sounds are normal. There is no distension. Palpations: Abdomen is soft. There is no mass. Tenderness: There is no abdominal tenderness. There is no guarding or rebound. Hernia: No hernia is present. Musculoskeletal:         General: No swelling, tenderness, deformity or signs of injury. Normal range of motion. Cervical back: Normal range of motion and neck supple. No rigidity or tenderness. Right lower leg: No edema. Left lower leg: No edema. Lymphadenopathy:      Cervical: No cervical adenopathy. Skin:     General: Skin is warm and dry. Coloration: Skin is not jaundiced or pale. Neurological:      General: No focal deficit present. Mental Status: She is alert and oriented to person, place, and time. Mental status is at baseline. Cranial Nerves: No cranial nerve deficit. Sensory: No sensory deficit. Motor: No weakness. Gait: Gait normal.   Psychiatric:         Attention and Perception: Attention and perception normal. She does not perceive auditory or visual hallucinations. Mood and Affect: Mood normal.         Speech: Speech normal.         Behavior: Behavior normal. Behavior is cooperative. Thought Content:  Thought content normal. Thought content is not paranoid or delusional. Thought content does not include homicidal or suicidal ideation. Thought content does not include homicidal or suicidal plan.          Judgment: Judgment normal.

## 2022-12-05 NOTE — PROGRESS NOTES
[address] Dianne Flores is a 34 y.o.  female and presents with    Chief Complaint   Patient presents with    Depression    Anxiety     Subjective:  Ms. Ashok Champagne presents to the office for medication follow up for treating her depression and anxiety. Patient states that she has been compliant with her medication and attending psychotherapy. She notes initially experiencing worsening of her symptoms the first two weeks of taking her medication, but began experiencing significant improvement by the third week. She reports that therapy is willing to see her once every two weeks and that she will be returning to work next week after being away for [employer]. She reports some anxiety with returning back to work but believes she can handle it. Patient denies SI, HI, a/v hallucinations, headache, syncope, CP, abdominal pain, dysuria, or hematuria. ROS   General ROS: negative for - chills or fever  Ophthalmic ROS: negative for - blurry vision or double vision  Respiratory ROS: positive for - shortness of breath associated with anxiety  Cardiovascular ROS: positive for - palpitations associated with anxiety  Neurological ROS: negative for - numbness/tingling or weakness  Dermatological ROS: negative for - rash or skin lesion changes    All other systems reviewed and are negative. Objective:  Vitals:    12/05/22 1112   BP: 132/83   Pulse: 88   Resp: 16   Temp: 98.3 °F (36.8 °C)   TempSrc: Temporal   SpO2: 100%   Weight: 134 lb (60.8 kg)   Height: 5' 1\" (1.549 m)   PainSc:   0 - No pain   LMP: 11/07/2022     Constitutional:       General: She is not in acute distress. Appearance: Normal appearance. She is normal weight. She is not ill-appearing, toxic-appearing or diaphoretic. HENT:      Head: Normocephalic and atraumatic. Right Ear: External ear normal.      Left Ear: External ear normal.      Nose: Nose normal. No rhinorrhea.       Mouth/Throat:      Mouth: Mucous membranes are moist.      Pharynx: Oropharynx is clear. No oropharyngeal exudate or posterior oropharyngeal erythema. Eyes:      General: No allergic shiner or scleral icterus. Right eye: No discharge. Left eye: No discharge. Extraocular Movements: Extraocular movements intact. Conjunctiva/sclera: Conjunctivae normal.      Pupils: Pupils are equal, round, and reactive to light. Neck:      Thyroid: No thyroid mass. Cardiovascular:      Pulses:           Radial pulses are 2+ on the right side and 2+ on the left side. Pulmonary:      Effort: No tachypnea or bradypnea. Breath sounds: No decreased air movement. No decreased breath sounds. Abdominal:      General: Abdomen is flat. Bowel sounds are normal. There is no distension. Palpations: Abdomen is soft. There is no mass. Tenderness: There is no abdominal tenderness. There is no guarding or rebound. Hernia: No hernia is present. Musculoskeletal:         General: No swelling, tenderness, deformity or signs of injury. Normal range of motion. Cervical back: Normal range of motion and neck supple. No rigidity or tenderness. Right lower leg: No edema. Left lower leg: No edema. Lymphadenopathy:      Cervical: No cervical adenopathy. Skin:     General: Skin is warm and dry. Coloration: Skin is not jaundiced or pale. Neurological:      General: No focal deficit present. Mental Status: She is alert and oriented to person, place, and time. Mental status is at baseline. Cranial Nerves: No cranial nerve deficit. Sensory: No sensory deficit. Motor: No weakness. Gait: Gait normal.   Psychiatric:         Attention and Perception: Attention and perception normal. She does not perceive auditory or visual hallucinations. Mood and Affect: anxious with normal affect     Speech: Speech normal.         Behavior: Behavior normal. Behavior is cooperative. Thought Content:  Thought content normal. Thought content is not paranoid or delusional. Thought content does not include homicidal or suicidal ideation. Thought content does not include homicidal or suicidal plan. Judgment: Judgment normal.     LABS     TESTS      Assessment/Plan:    1. Anxiety  Ongoing symptoms with adjustment to medication    2. Moderate recurrent major depression ([facility] [address]  Improved after starting treatment    3. Bronchitis  Continue rest and hydration      Lab review: no lab studies available for review at time of visit      I have discussed the diagnosis with the patient and the intended plan as seen in the above orders. The patient has received an after-visit summary and questions were answered concerning future plans. I have discussed medication side effects and warnings with the patient as well. I have reviewed the plan of care with the patient, accepted their input and they are in agreement with the treatment goals.

## 2022-12-16 ENCOUNTER — TELEPHONE (OUTPATIENT)
Dept: FAMILY MEDICINE CLINIC | Age: 32
End: 2022-12-16

## 2022-12-16 ENCOUNTER — DOCUMENTATION ONLY (OUTPATIENT)
Dept: FAMILY MEDICINE CLINIC | Age: 32
End: 2022-12-16

## 2022-12-16 NOTE — TELEPHONE ENCOUNTER
Patient reports to the office asking for a return to work letter from her last visit (Dec 5, 2022) till her return to work day of Dec 12, 2022. Thank you.

## 2024-07-29 ENCOUNTER — OFFICE VISIT (OUTPATIENT)
Facility: CLINIC | Age: 34
End: 2024-07-29

## 2024-07-29 ENCOUNTER — HOSPITAL ENCOUNTER (OUTPATIENT)
Facility: HOSPITAL | Age: 34
Setting detail: SPECIMEN
Discharge: HOME | End: 2024-08-01

## 2024-07-29 VITALS
SYSTOLIC BLOOD PRESSURE: 118 MMHG | BODY MASS INDEX: 27.15 KG/M2 | HEART RATE: 67 BPM | OXYGEN SATURATION: 100 % | HEIGHT: 61 IN | RESPIRATION RATE: 18 BRPM | WEIGHT: 143.8 LBS | TEMPERATURE: 98 F | DIASTOLIC BLOOD PRESSURE: 73 MMHG

## 2024-07-29 DIAGNOSIS — F33.42 RECURRENT MAJOR DEPRESSIVE DISORDER, IN FULL REMISSION (HCC): ICD-10-CM

## 2024-07-29 DIAGNOSIS — L30.0 NUMMULAR ECZEMA: ICD-10-CM

## 2024-07-29 DIAGNOSIS — D49.2 SKIN NEOPLASM: ICD-10-CM

## 2024-07-29 DIAGNOSIS — Z00.00 ANNUAL PHYSICAL EXAM: Primary | ICD-10-CM

## 2024-07-29 DIAGNOSIS — R68.89 COLD INTOLERANCE: ICD-10-CM

## 2024-07-29 LAB
BASOPHILS # BLD: 0 K/UL (ref 0–0.1)
BASOPHILS NFR BLD: 1 % (ref 0–2)
DIFFERENTIAL METHOD BLD: ABNORMAL
EOSINOPHIL # BLD: 0.3 K/UL (ref 0–0.4)
EOSINOPHIL NFR BLD: 6 % (ref 0–5)
ERYTHROCYTE [DISTWIDTH] IN BLOOD BY AUTOMATED COUNT: 12.7 % (ref 11.6–14.5)
HCT VFR BLD AUTO: 40.6 % (ref 35–45)
HGB BLD-MCNC: 12.7 G/DL (ref 12–16)
IMM GRANULOCYTES # BLD AUTO: 0 K/UL (ref 0–0.04)
IMM GRANULOCYTES NFR BLD AUTO: 0 % (ref 0–0.5)
LYMPHOCYTES # BLD: 1.4 K/UL (ref 0.9–3.6)
LYMPHOCYTES NFR BLD: 33 % (ref 21–52)
MCH RBC QN AUTO: 27.8 PG (ref 24–34)
MCHC RBC AUTO-ENTMCNC: 31.3 G/DL (ref 31–37)
MCV RBC AUTO: 88.8 FL (ref 78–100)
MONOCYTES # BLD: 0.4 K/UL (ref 0.05–1.2)
MONOCYTES NFR BLD: 9 % (ref 3–10)
NEUTS SEG # BLD: 2.1 K/UL (ref 1.8–8)
NEUTS SEG NFR BLD: 50 % (ref 40–73)
NRBC # BLD: 0 K/UL (ref 0–0.01)
NRBC BLD-RTO: 0 PER 100 WBC
PLATELET # BLD AUTO: 363 K/UL (ref 135–420)
PMV BLD AUTO: 10.2 FL (ref 9.2–11.8)
RBC # BLD AUTO: 4.57 M/UL (ref 4.2–5.3)
TSH SERPL DL<=0.05 MIU/L-ACNC: 0.65 UIU/ML (ref 0.36–3.74)
WBC # BLD AUTO: 4.2 K/UL (ref 4.6–13.2)

## 2024-07-29 PROCEDURE — 99395 PREV VISIT EST AGE 18-39: CPT | Performed by: FAMILY MEDICINE

## 2024-07-29 PROCEDURE — 85025 COMPLETE CBC W/AUTO DIFF WBC: CPT

## 2024-07-29 PROCEDURE — 36415 COLL VENOUS BLD VENIPUNCTURE: CPT

## 2024-07-29 PROCEDURE — 84443 ASSAY THYROID STIM HORMONE: CPT

## 2024-07-29 RX ORDER — TRIAMCINOLONE ACETONIDE 0.25 MG/G
OINTMENT TOPICAL
Qty: 80 G | Refills: 1 | Status: SHIPPED | OUTPATIENT
Start: 2024-07-29 | End: 2024-08-05

## 2024-07-29 SDOH — ECONOMIC STABILITY: FOOD INSECURITY: WITHIN THE PAST 12 MONTHS, YOU WORRIED THAT YOUR FOOD WOULD RUN OUT BEFORE YOU GOT MONEY TO BUY MORE.: NEVER TRUE

## 2024-07-29 SDOH — ECONOMIC STABILITY: FOOD INSECURITY: WITHIN THE PAST 12 MONTHS, THE FOOD YOU BOUGHT JUST DIDN'T LAST AND YOU DIDN'T HAVE MONEY TO GET MORE.: NEVER TRUE

## 2024-07-29 SDOH — ECONOMIC STABILITY: HOUSING INSECURITY
IN THE LAST 12 MONTHS, WAS THERE A TIME WHEN YOU DID NOT HAVE A STEADY PLACE TO SLEEP OR SLEPT IN A SHELTER (INCLUDING NOW)?: NO

## 2024-07-29 SDOH — ECONOMIC STABILITY: INCOME INSECURITY: HOW HARD IS IT FOR YOU TO PAY FOR THE VERY BASICS LIKE FOOD, HOUSING, MEDICAL CARE, AND HEATING?: NOT VERY HARD

## 2024-07-29 ASSESSMENT — PATIENT HEALTH QUESTIONNAIRE - PHQ9
8. MOVING OR SPEAKING SO SLOWLY THAT OTHER PEOPLE COULD HAVE NOTICED. OR THE OPPOSITE, BEING SO FIGETY OR RESTLESS THAT YOU HAVE BEEN MOVING AROUND A LOT MORE THAN USUAL: NOT AT ALL
SUM OF ALL RESPONSES TO PHQ QUESTIONS 1-9: 0
SUM OF ALL RESPONSES TO PHQ QUESTIONS 1-9: 0
5. POOR APPETITE OR OVEREATING: NOT AT ALL
7. TROUBLE CONCENTRATING ON THINGS, SUCH AS READING THE NEWSPAPER OR WATCHING TELEVISION: NOT AT ALL
4. FEELING TIRED OR HAVING LITTLE ENERGY: NOT AT ALL
3. TROUBLE FALLING OR STAYING ASLEEP: NOT AT ALL
2. FEELING DOWN, DEPRESSED OR HOPELESS: NOT AT ALL
10. IF YOU CHECKED OFF ANY PROBLEMS, HOW DIFFICULT HAVE THESE PROBLEMS MADE IT FOR YOU TO DO YOUR WORK, TAKE CARE OF THINGS AT HOME, OR GET ALONG WITH OTHER PEOPLE: NOT DIFFICULT AT ALL
SUM OF ALL RESPONSES TO PHQ QUESTIONS 1-9: 0
SUM OF ALL RESPONSES TO PHQ9 QUESTIONS 1 & 2: 0
SUM OF ALL RESPONSES TO PHQ QUESTIONS 1-9: 0
9. THOUGHTS THAT YOU WOULD BE BETTER OFF DEAD, OR OF HURTING YOURSELF: NOT AT ALL
1. LITTLE INTEREST OR PLEASURE IN DOING THINGS: NOT AT ALL
6. FEELING BAD ABOUT YOURSELF - OR THAT YOU ARE A FAILURE OR HAVE LET YOURSELF OR YOUR FAMILY DOWN: NOT AT ALL

## 2024-07-29 ASSESSMENT — ANXIETY QUESTIONNAIRES
5. BEING SO RESTLESS THAT IT IS HARD TO SIT STILL: NOT AT ALL
GAD7 TOTAL SCORE: 0
7. FEELING AFRAID AS IF SOMETHING AWFUL MIGHT HAPPEN: NOT AT ALL
6. BECOMING EASILY ANNOYED OR IRRITABLE: NOT AT ALL
2. NOT BEING ABLE TO STOP OR CONTROL WORRYING: NOT AT ALL
1. FEELING NERVOUS, ANXIOUS, OR ON EDGE: NOT AT ALL
IF YOU CHECKED OFF ANY PROBLEMS ON THIS QUESTIONNAIRE, HOW DIFFICULT HAVE THESE PROBLEMS MADE IT FOR YOU TO DO YOUR WORK, TAKE CARE OF THINGS AT HOME, OR GET ALONG WITH OTHER PEOPLE: NOT DIFFICULT AT ALL
4. TROUBLE RELAXING: NOT AT ALL
3. WORRYING TOO MUCH ABOUT DIFFERENT THINGS: NOT AT ALL

## 2024-07-29 ASSESSMENT — LIFESTYLE VARIABLES
HOW MANY STANDARD DRINKS CONTAINING ALCOHOL DO YOU HAVE ON A TYPICAL DAY: 1 OR 2
HOW OFTEN DO YOU HAVE A DRINK CONTAINING ALCOHOL: MONTHLY OR LESS

## 2024-07-29 ASSESSMENT — VISUAL ACUITY
OD_CC: 20/15
OS_CC: 20/13

## 2024-07-29 NOTE — PROGRESS NOTES
Alba Davis is a 33 y.o. presents today for   Chief Complaint   Patient presents with    Annual Exam     Is someone accompanying this pt? no    Is the patient using any DME equipment during OV? no  There were no vitals filed for this visit.    Depression Screenin/29/2024     8:40 AM 2022    11:12 AM 11/10/2022     1:53 PM 11/10/2022     1:41 PM   PHQ-9 Questionaire   Little interest or pleasure in doing things 0 0 1 1   Feeling down, depressed, or hopeless 0 0 2 1   Trouble falling or staying asleep, or sleeping too much 0  2    Feeling tired or having little energy 0  1    Poor appetite or overeating 0  0    Feeling bad about yourself - or that you are a failure or have let yourself or your family down 0  1    Trouble concentrating on things, such as reading the newspaper or watching television 0  2    Moving or speaking so slowly that other people could have noticed. Or the opposite - being so fidgety or restless that you have been moving around a lot more than usual 0  2    Thoughts that you would be better off dead, or of hurting yourself in some way 0      PHQ-9 Total Score 0 0 11 2   If you checked off any problems, how difficult have these problems made it for you to do your work, take care of things at home, or get along with other people? 0           Abuse Screening:       No data to display                 Learning Assessment Screening:   No question data found.     Fall Risk Screening:        No data to display                    Health Maintenance: reviewed and discussed and ordered per Provider.    Health Maintenance Due   Topic Date Due    Hepatitis B vaccine (1 of 3 - 3-dose series) Never done    COVID-19 Vaccine (1) Never done    Varicella vaccine (1 of 2 - 2-dose childhood series) Never done    Depression Monitoring  2023         Coordination of Care:   1. \"Have you been to the ER, urgent care clinic since your last visit?  Hospitalized since your last visit?\"

## 2024-07-29 NOTE — PROGRESS NOTES
Alba Davis is a 33 y.o.  female and presents with    Chief Complaint   Patient presents with    Annual Exam    Mole    Abrasion       Subjective:  Well Adult Physical   Patient here for a comprehensive physical exam.The patient reports problems - skin lesions which are new and annular and increasing in frequency; they are on the right arm more that other locations; also on left flank; she has seasonal allergies; no h/o asthma as a child  Additionally, she has papule on right lower abdomen and behind left ear; initially erythematous and now black  Do you take any herbs or supplements that were not prescribed by a doctor? yes Are you taking calcium supplements? yes Are you taking aspirin daily? not applicable    ROS   General ROS: negative for - chills, fatigue, fever, or sleep disturbance  Psychological ROS: positive for - stressors relieved; she is no longer taking duloxetine for anxiety/depression  Ophthalmic ROS: positive for - uses glasses  ENT ROS: positive for - nasal congestion and sinus pressure  Endocrine ROS: negative for - polydipsia/polyuria or unexpected weight changes; + cold intolerance  Respiratory ROS: no cough, shortness of breath, or wheezing  Cardiovascular ROS: no chest pain or dyspnea on exertion  Gastrointestinal ROS: positive for abdominal pain in the epigastrum; she describes this as knots.  This has resolved; she had constipation after travel a few weeks ago  Genito-Urinary ROS: no dysuria, trouble voiding, or hematuria  Neurological ROS: negative for - numbness/tingling or weakness  Dermatological ROS: see HPI    All other systems reviewed and are negative.    Objective:  /73 (Site: Right Upper Arm, Position: Sitting, Cuff Size: Medium Adult)   Pulse 67   Temp 98 °F (36.7 °C) (Temporal)   Resp 18   Ht 1.549 m (5' 1\")   Wt 65.2 kg (143 lb 12.8 oz)   LMP 07/21/2024   SpO2 100%   BMI 27.17 kg/m²     General Appearance:  Alert, cooperative, no distress,

## 2024-08-15 ENCOUNTER — TELEPHONE (OUTPATIENT)
Facility: CLINIC | Age: 34
End: 2024-08-15

## 2024-08-15 NOTE — TELEPHONE ENCOUNTER
Referred to [facility]  [address]  [address]  [place] [address]    P- [phone]  F- [phone]

## (undated) DEVICE — APPLICATOR SCRB 26ML TEAL STRL -- CHLORAPREP 26ML

## (undated) DEVICE — DRAPE TOWEL: Brand: CONVERTORS

## (undated) DEVICE — COVER LT HNDL BLU STRL -- MEDICHOICE

## (undated) DEVICE — SUTURE VCRL SZ 3-0 L18IN ABSRB UD POLYGLACTIN 910 BRAID TIE J910T

## (undated) DEVICE — SUTURE MCRYL SZ 4-0 L18IN ABSRB UD L19MM PS-2 3/8 CIR PRIM Y496G

## (undated) DEVICE — REM POLYHESIVE ADULT PATIENT RETURN ELECTRODE: Brand: VALLEYLAB

## (undated) DEVICE — APPLICATOR BNDG 1MM ADH PREMIERPRO EXOFIN

## (undated) DEVICE — GARMENT,MEDLINE,DVT,INT,CALF,MED, GEN2: Brand: MEDLINE

## (undated) DEVICE — BLANKET WRM AD W50XL85.8IN PACU FULL BODY FORC AIR

## (undated) DEVICE — STERILE POLYISOPRENE POWDER-FREE SURGICAL GLOVES: Brand: PROTEXIS

## (undated) DEVICE — SHEET, T, LAPAROTOMY, STERILE: Brand: MEDLINE

## (undated) DEVICE — DERMABOND SKIN ADH 0.7ML -- DERMABOND ADVANCED 12/BX

## (undated) DEVICE — INSULATED BLADE ELECTRODE: Brand: EDGE

## (undated) DEVICE — SUTURE VCRL SZ 3-0 L27IN ABSRB UD L26MM SH 1/2 CIR J416H

## (undated) DEVICE — MEDI-VAC NON-CONDUCTIVE SUCTION TUBING: Brand: CARDINAL HEALTH

## (undated) DEVICE — PACK PROCEDURE SURG MAJ W/ BASIN LF

## (undated) DEVICE — SOLUTION IV 1000ML 0.9% SOD CHL

## (undated) DEVICE — MEDI-VAC SUCTION HIGH CAPACITY: Brand: CARDINAL HEALTH

## (undated) DEVICE — INTENDED FOR TISSUE SEPARATION, AND OTHER PROCEDURES THAT REQUIRE A SHARP SURGICAL BLADE TO PUNCTURE OR CUT.: Brand: BARD-PARKER ®  SAFETY SCALPED